# Patient Record
Sex: MALE | Race: WHITE | Employment: OTHER | URBAN - METROPOLITAN AREA
[De-identification: names, ages, dates, MRNs, and addresses within clinical notes are randomized per-mention and may not be internally consistent; named-entity substitution may affect disease eponyms.]

---

## 2017-12-17 ENCOUNTER — APPOINTMENT (OUTPATIENT)
Dept: GENERAL RADIOLOGY | Age: 72
End: 2017-12-17
Payer: MEDICARE

## 2017-12-17 ENCOUNTER — HOSPITAL ENCOUNTER (EMERGENCY)
Age: 72
Discharge: HOME | End: 2017-12-17
Attending: EMERGENCY MEDICINE
Payer: MEDICARE

## 2017-12-17 VITALS
RESPIRATION RATE: 18 BRPM | SYSTOLIC BLOOD PRESSURE: 130 MMHG | WEIGHT: 236 LBS | TEMPERATURE: 97.4 F | HEIGHT: 70 IN | OXYGEN SATURATION: 95 % | HEART RATE: 87 BPM | BODY MASS INDEX: 33.79 KG/M2 | DIASTOLIC BLOOD PRESSURE: 91 MMHG

## 2017-12-17 DIAGNOSIS — S80.12XA CONTUSION OF LEFT LOWER EXTREMITY, INITIAL ENCOUNTER: Primary | ICD-10-CM

## 2017-12-17 DIAGNOSIS — S81.812A LACERATION OF LEFT LOWER EXTREMITY, INITIAL ENCOUNTER: ICD-10-CM

## 2017-12-17 DIAGNOSIS — S20.212A CONTUSION OF RIB ON LEFT SIDE, INITIAL ENCOUNTER: ICD-10-CM

## 2017-12-17 PROCEDURE — 6360000002 HC RX W HCPCS: Performed by: NURSE PRACTITIONER

## 2017-12-17 PROCEDURE — 2500000003 HC RX 250 WO HCPCS: Performed by: NURSE PRACTITIONER

## 2017-12-17 PROCEDURE — 71101 X-RAY EXAM UNILAT RIBS/CHEST: CPT

## 2017-12-17 PROCEDURE — 6370000000 HC RX 637 (ALT 250 FOR IP): Performed by: NURSE PRACTITIONER

## 2017-12-17 PROCEDURE — 99282 EMERGENCY DEPT VISIT SF MDM: CPT

## 2017-12-17 PROCEDURE — 73590 X-RAY EXAM OF LOWER LEG: CPT

## 2017-12-17 PROCEDURE — 90471 IMMUNIZATION ADMIN: CPT | Performed by: NURSE PRACTITIONER

## 2017-12-17 PROCEDURE — 90715 TDAP VACCINE 7 YRS/> IM: CPT | Performed by: NURSE PRACTITIONER

## 2017-12-17 RX ORDER — HYDROCODONE BITARTRATE AND ACETAMINOPHEN 5; 325 MG/1; MG/1
1 TABLET ORAL EVERY 6 HOURS PRN
Qty: 20 TABLET | Refills: 0 | Status: SHIPPED | OUTPATIENT
Start: 2017-12-17 | End: 2017-12-24

## 2017-12-17 RX ORDER — LIDOCAINE HYDROCHLORIDE 10 MG/ML
20 INJECTION, SOLUTION INFILTRATION; PERINEURAL ONCE
Status: COMPLETED | OUTPATIENT
Start: 2017-12-17 | End: 2017-12-17

## 2017-12-17 RX ORDER — CEPHALEXIN 500 MG/1
500 CAPSULE ORAL 2 TIMES DAILY
Qty: 14 CAPSULE | Refills: 0 | Status: SHIPPED | OUTPATIENT
Start: 2017-12-17 | End: 2017-12-24

## 2017-12-17 RX ORDER — HYDROCODONE BITARTRATE AND ACETAMINOPHEN 5; 325 MG/1; MG/1
1 TABLET ORAL ONCE
Status: COMPLETED | OUTPATIENT
Start: 2017-12-17 | End: 2017-12-17

## 2017-12-17 RX ADMIN — LIDOCAINE HYDROCHLORIDE 20 ML: 10 INJECTION, SOLUTION INFILTRATION; PERINEURAL at 13:19

## 2017-12-17 RX ADMIN — HYDROCODONE BITARTRATE AND ACETAMINOPHEN 1 TABLET: 5; 325 TABLET ORAL at 12:44

## 2017-12-17 RX ADMIN — TETANUS TOXOID, REDUCED DIPHTHERIA TOXOID AND ACELLULAR PERTUSSIS VACCINE, ADSORBED 0.5 ML: 5; 2.5; 8; 8; 2.5 SUSPENSION INTRAMUSCULAR at 13:19

## 2017-12-17 ASSESSMENT — PAIN SCALES - GENERAL
PAINLEVEL_OUTOF10: 0
PAINLEVEL_OUTOF10: 10
PAINLEVEL_OUTOF10: 7

## 2017-12-17 ASSESSMENT — PAIN DESCRIPTION - ORIENTATION: ORIENTATION: LEFT;LOWER

## 2017-12-17 ASSESSMENT — PAIN DESCRIPTION - LOCATION: LOCATION: LEG

## 2017-12-17 ASSESSMENT — PAIN DESCRIPTION - DESCRIPTORS: DESCRIPTORS: SHARP;BURNING;THROBBING

## 2017-12-17 ASSESSMENT — PAIN DESCRIPTION - FREQUENCY: FREQUENCY: CONTINUOUS

## 2017-12-17 ASSESSMENT — PAIN SCALES - WONG BAKER: WONGBAKER_NUMERICALRESPONSE: 10

## 2017-12-20 NOTE — ED PROVIDER NOTES
[address] [place] [address] ED  eMERGENCY dEPARTMENT eNCOUnter      Pt Name: Vida Sesay  MRN: [id number]  [place] 1945  Date of evaluation: 12/17/2017  Provider: Tamela Lobo NP    CHIEF COMPLAINT       Chief Complaint   Patient presents with    Laceration     left lower leg,  hit on piece a furniture         HISTORY OF PRESENT ILLNESS  (Location/Symptom, Timing/Onset, Context/Setting, Quality, Duration, Modifying Factors, Severity.)   Vida Sesay is a 67 y.o. male who presents to the emergency department The private auto with complaints of leg pain and laceration. Less than 2 hours prior to arrival while attempting to lay down a heavy oak dresser the [employer] fell out of his hand and landed on his left lower leg sliding on the way down to his foot Resulting in laceration. He has pain to the left lower leg and left ribs. He states after he dropped a dresser he fell backwards and is having pain to the left lateral and posterior ribs. No neck or low back pain. No difficulty breathing. Pain for both his constant and worse with movement. The laceration to the leg had mild to moderate bleeding at the time of injury. He is unsure of his last tetanus update. Nursing Notes were reviewed. ALLERGIES     Review of patient's allergies indicates no known allergies. CURRENT MEDICATIONS       Discharge Medication List as of 12/17/2017  2:31 PM      CONTINUE these medications which have NOT CHANGED    Details   aspirin 81 MG tablet Take 81 mg by mouth 2 times daily. Dextrose, Diabetic Use, (GLUCOSE PO) Take 16 g by mouth. furosemide (LASIX) 40 MG tablet Take 40 mg by mouth daily. atorvastatin (LIPITOR) 80 MG tablet Take 80 mg by mouth nightly.      magnesium oxide (MAG-OX) 400 MG tablet Take 400 mg by mouth daily. metFORMIN (GLUCOPHAGE) 500 MG tablet Take 500 mg by mouth 2 times daily. metoprolol (LOPRESSOR) 25 MG tablet Take 25 mg by mouth 2 times daily.       potassium chloride (KLOR-CON) 20 MEQ packet Take 20 mEq by mouth daily. traMADol (ULTRAM) 50 MG tablet Take 50 mg by mouth every 4 hours as needed for Pain. acetic acid 0.25 % irrigation Irrigate with as directed daily. , Disp-1000 mL, R-4, Print             PAST MEDICAL HISTORY         Diagnosis Date    Arthritis     CAD (coronary artery disease)     atrial fib    Cancer (HCC)     melonoma head    CHF (congestive heart failure) (HCC)     [facility] in house [facility]    Diabetes mellitus ([address]     HgA1C over 9  medication induced    GERD (gastroesophageal reflux disease)      polpys removed   GI bleed    Hyperlipidemia     Hypertension     Liver disease     kidney stones    Other disorders of kidney and ureter in diseases classified elsewhere     Pneumonia     aspiration pneumonia,  drug induced coma 10/2014       SURGICAL HISTORY           Procedure Laterality Date    ABDOMEN SURGERY      hernia repair dr. Lisandra Faustin x5 07/09/2012 [place]    CHOLECYSTECTOMY      COLONOSCOPY      HERNIA REPAIR      KNEE ARTHROSCOPY           FAMILY HISTORY     History reviewed. No pertinent family history. No family status information on file. SOCIAL HISTORY      reports that he has never smoked. He has never used smokeless tobacco. He reports that he does not drink alcohol or use drugs. REVIEW OF SYSTEMS    (2-9 systems for level 4, 10 or more for level 5)     Review of Systems   All other systems reviewed and are negative. Except as noted above the remainder of the review of systems was reviewed and negative. PHYSICAL EXAM    (up to 7 for level 4, 8 or more for level 5)     ED Triage Vitals [12/17/17 1225]   BP Temp Temp Source Pulse Resp SpO2 Height Weight   (!) 130/91 97.4 °F (36.3 °C) Oral 87 18 95 % 5' 10\" (1.778 m) 236 lb (107 kg)       Physical Exam   Constitutional: He is oriented to person, place, and time. He appears well-developed and well-nourished.    HENT:   Head: Normocephalic and atraumatic. Eyes: Conjunctivae are normal.   Neck: Normal range of motion. No midline cervical tenderness   Cardiovascular: Normal rate and regular rhythm. Pulmonary/Chest: Effort normal and breath sounds normal.         He exhibits no tenderness. Musculoskeletal: Normal range of motion. He exhibits no edema or deformity. No midline thoracic or lumbar tenderness. He does have tenderness to the anterior left lower leg. Full range of motion to the ankle, knee and hip. Neurological: He is alert and oriented to person, place, and time. Skin: Skin is warm and dry. 6 cm flap laceration to the anterior left lower leg with mild bleeding         DIAGNOSTIC RESULTS     EKG: All EKG's are interpreted by the Emergency Department Physician who either signs or Co-signs this chart in the absence of a cardiologist.    RADIOLOGY:   Non-plain film images such as CT, Ultrasound and MRI are read by the radiologist. Plain radiographic images are visualized and preliminarily interpreted by the emergency physician with the below findings:    Interpretation per the Radiologist below, if available at the time of this note:    XR RIBS LEFT INCLUDE CHEST (MIN 3 VIEWS)   Final Result   No acute findings. XR TIBIA FIBULA LEFT (2 VIEWS)   Final Result   1. No evidence of acute osseous abnormality in the left leg. 2.  Extensive vascular calcification. LABS:  Labs Reviewed - No data to display    All other labs were within normal range or not returned as of this dictation. EMERGENCY DEPARTMENT COURSE and DIFFERENTIAL DIAGNOSIS/MDM:   Vitals:    Vitals:    17 1225   BP: (!) 130/91   Pulse: 87   Resp: 18   Temp: 97.4 °F (36.3 °C)   TempSrc: Oral   SpO2: 95%   Weight: 236 lb (107 kg)   Height: 5' 10\" (1.778 m)       Medical Decision Makin-year-old male with injury and laceration. He was medicated for pain and updated on his tetanus. Laceration was repaired as documented below. He was discharged home to follow up with his primary care provider for recheck. He will return to the emergency room for any new or worsening symptoms. PROCEDURES:  Lac Repair  Date/Time: 12/20/2017 3:17 PM  Performed by: Naveed Ewing  Authorized by: Narayan Vera     Consent:     Consent obtained:  Verbal    Consent given by:  Patient    Risks discussed:  Infection, need for additional repair and poor wound healing  Anesthesia (see MAR for exact dosages): Anesthesia method:  Local infiltration    Local anesthetic:  Lidocaine 1% w/o epi  Laceration details:     Location:  Leg    Leg location:  L lower leg    Length (cm):  6  Pre-procedure details:     Preparation:  Patient was prepped and draped in usual sterile fashion and imaging obtained to evaluate for foreign bodies  Exploration:     Wound exploration: wound explored through full range of motion      Wound extent: no foreign bodies/material noted and no underlying fracture noted    Treatment:     Area cleansed with:  Betadine and saline    Amount of cleaning:  Standard  Skin repair:     Repair method:  Sutures    Suture size:  4-0    Suture material:  Nylon    Suture technique:  Simple interrupted  Approximation:     Approximation:  Close  Post-procedure details:     Dressing:  Non-adherent dressing    Patient tolerance of procedure: Tolerated well, no immediate complications        FINAL IMPRESSION      1. Contusion of left lower extremity, initial encounter    2. Contusion of rib on left side, initial encounter    3.  Laceration of left lower extremity, initial encounter          DISPOSITION/PLAN   DISPOSITION Decision To Discharge 12/17/2017 02:29:25 PM      PATIENT REFERRED TO:   Lara Recio MD  [place] [id number]  [phone]      For suture removal in 7-10 days    Call the number below if you need to establish yourself with a new primary care provider  Aleah Xavier ([phone])          DISCHARGE MEDICATIONS:

## 2018-01-03 ENCOUNTER — HOSPITAL ENCOUNTER (EMERGENCY)
Age: 73
Discharge: HOME | End: 2018-01-03
Attending: EMERGENCY MEDICINE
Payer: MEDICARE

## 2018-01-03 VITALS
HEART RATE: 70 BPM | OXYGEN SATURATION: 96 % | RESPIRATION RATE: 18 BRPM | TEMPERATURE: 98.3 F | SYSTOLIC BLOOD PRESSURE: 132 MMHG | DIASTOLIC BLOOD PRESSURE: 73 MMHG

## 2018-01-03 DIAGNOSIS — Z51.89 VISIT FOR WOUND CHECK: Primary | ICD-10-CM

## 2018-01-03 DIAGNOSIS — Z48.02 VISIT FOR SUTURE REMOVAL: ICD-10-CM

## 2018-01-03 DIAGNOSIS — M79.606 PAIN OF LOWER EXTREMITY, UNSPECIFIED LATERALITY: ICD-10-CM

## 2018-01-03 PROCEDURE — 99281 EMR DPT VST MAYX REQ PHY/QHP: CPT

## 2018-01-03 RX ORDER — HYDROCODONE BITARTRATE AND ACETAMINOPHEN 5; 325 MG/1; MG/1
1 TABLET ORAL EVERY 6 HOURS PRN
Qty: 20 TABLET | Refills: 0 | Status: SHIPPED | OUTPATIENT
Start: 2018-01-03 | End: 2018-01-10

## 2018-01-03 NOTE — ED PROVIDER NOTES
[address] [place] [address] ED  Emergency Department  Faculty Attestation     Pt Name: Maribel Strauss  MRN: [id number]  [place] 1945  Date of evaluation: 1/3/18    I was personally available for consultation in the Emergency Department. Have reviewed everything on the chart that is available and agree with the documentation provided by the [facility] AND [facility], including discussion about the assessment, treatment plan and disposition. Maribel Strauss is a 68 y.o. male who presents with Suture / Staple Removal      Vitals:   Vitals:    01/03/18 1350   BP: 132/73   Pulse: 70   Resp: 18   Temp: 98.3 °F (36.8 °C)   SpO2: 96%       Impression:   1. Visit for wound check    2. Visit for suture removal    3.  Pain of lower extremity, unspecified laterality        Caren Bello MD  Attending Emergency Physician      (Please note that portions of this note were completed with a voice recognition program.  Efforts were made to edit the dictations but occasionally words are mis-transcribed.)        Caren Bello MD  01/03/18 7677
occasionally words are mis-transcribed. )    ZEYNEP RENNER NP  01/03/18 8815

## 2018-01-03 NOTE — ED NOTES
Pt presents to ED via private auto with c/o Suture removal.  Pt was seen on 12/17 for laceration. Pt here for removal.  Site is well approximated with hematoma still present around wound. No tenderness noted. Denies fever, chest pain, SOB, N/V/D, blurry vision and dizziness at this time. Pt is alert and oriented x 4 with no signs of distress noted.   Pt is resting on cot       Breana Pérez RN  01/03/18 2811

## 2018-01-31 ENCOUNTER — OFFICE VISIT (OUTPATIENT)
Dept: INTERNAL MEDICINE | Age: 73
End: 2018-01-31
Payer: MEDICARE

## 2018-01-31 ENCOUNTER — HOSPITAL ENCOUNTER (OUTPATIENT)
Age: 73
Discharge: HOME | End: 2018-01-31
Payer: MEDICARE

## 2018-01-31 VITALS
DIASTOLIC BLOOD PRESSURE: 74 MMHG | WEIGHT: 240 LBS | HEART RATE: 83 BPM | OXYGEN SATURATION: 94 % | BODY MASS INDEX: 34.44 KG/M2 | SYSTOLIC BLOOD PRESSURE: 114 MMHG

## 2018-01-31 DIAGNOSIS — Z13.220 SCREENING FOR HYPERLIPIDEMIA: ICD-10-CM

## 2018-01-31 DIAGNOSIS — E11.9 TYPE 2 DIABETES MELLITUS WITHOUT COMPLICATION, WITHOUT LONG-TERM CURRENT USE OF INSULIN (HCC): Primary | ICD-10-CM

## 2018-01-31 DIAGNOSIS — E78.00 HYPERCHOLESTEREMIA: ICD-10-CM

## 2018-01-31 DIAGNOSIS — I10 BENIGN ESSENTIAL HTN: ICD-10-CM

## 2018-01-31 DIAGNOSIS — I50.42 CHRONIC COMBINED SYSTOLIC AND DIASTOLIC CONGESTIVE HEART FAILURE (HCC): ICD-10-CM

## 2018-01-31 DIAGNOSIS — M25.512 CHRONIC LEFT SHOULDER PAIN: ICD-10-CM

## 2018-01-31 DIAGNOSIS — S81.802D WOUND OF LEFT LOWER EXTREMITY, SUBSEQUENT ENCOUNTER: ICD-10-CM

## 2018-01-31 DIAGNOSIS — G89.29 CHRONIC LEFT SHOULDER PAIN: ICD-10-CM

## 2018-01-31 LAB
CHOLESTEROL, FASTING: 163 MG/DL
CHOLESTEROL/HDL RATIO: 3
HDLC SERPL-MCNC: 55 MG/DL
LDL CHOLESTEROL: 76 MG/DL (ref 0–130)
TRIGLYCERIDE, FASTING: 162 MG/DL
VLDLC SERPL CALC-MCNC: ABNORMAL MG/DL (ref 1–30)

## 2018-01-31 PROCEDURE — 80061 LIPID PANEL: CPT

## 2018-01-31 PROCEDURE — 36415 COLL VENOUS BLD VENIPUNCTURE: CPT

## 2018-01-31 PROCEDURE — 99203 OFFICE O/P NEW LOW 30 MIN: CPT | Performed by: INTERNAL MEDICINE

## 2018-01-31 RX ORDER — ATORVASTATIN CALCIUM 80 MG/1
80 TABLET, FILM COATED ORAL NIGHTLY
Qty: 90 TABLET | Refills: 3 | Status: SHIPPED | OUTPATIENT
Start: 2018-01-31

## 2018-01-31 RX ORDER — LISINOPRIL 2.5 MG/1
2.5 TABLET ORAL DAILY
COMMUNITY
End: 2018-01-31 | Stop reason: SDUPTHER

## 2018-01-31 RX ORDER — FUROSEMIDE 40 MG/1
40 TABLET ORAL DAILY
Qty: 90 TABLET | Refills: 3 | Status: SHIPPED | OUTPATIENT
Start: 2018-01-31

## 2018-01-31 RX ORDER — LISINOPRIL 2.5 MG/1
2.5 TABLET ORAL DAILY
Qty: 90 TABLET | Refills: 3 | Status: SHIPPED | OUTPATIENT
Start: 2018-01-31

## 2018-01-31 RX ORDER — TRAMADOL HYDROCHLORIDE 50 MG/1
50 TABLET ORAL EVERY 4 HOURS PRN
Qty: 120 TABLET | Refills: 0 | Status: SHIPPED | OUTPATIENT
Start: 2018-01-31 | End: 2018-07-28

## 2018-01-31 ASSESSMENT — PATIENT HEALTH QUESTIONNAIRE - PHQ9
SUM OF ALL RESPONSES TO PHQ QUESTIONS 1-9: 0
1. LITTLE INTEREST OR PLEASURE IN DOING THINGS: 0
2. FEELING DOWN, DEPRESSED OR HOPELESS: 0
SUM OF ALL RESPONSES TO PHQ9 QUESTIONS 1 & 2: 0

## 2018-01-31 ASSESSMENT — ENCOUNTER SYMPTOMS
GASTROINTESTINAL NEGATIVE: 1
ALLERGIC/IMMUNOLOGIC NEGATIVE: 1
EYES NEGATIVE: 1
SHORTNESS OF BREATH: 1

## 2018-01-31 NOTE — PROGRESS NOTES
[facility] PHYSICIANS  [facility] INTERNAL MEDICINE ST Zeinab Campbell [facility] [address]  [address]  [place] [address]  Dept: [phone]  Dept Fax: [phone]  [place]   Tatiana Prescott is a 68 y.o. male who presents today for   Chief Complaint   Patient presents with   [facility] Other     patient has a open wound the left leg. Patient had stitches on dec. [address] Maintenance     Patient states he recieved pneumo vaccine at [facility], colonoscopy done 2014    Knee Pain    and follow up of chronic medical problems:   Patient Active Problem List   Diagnosis    Screening for hyperlipidemia    Type 2 diabetes mellitus without complication, without long-term current use of insulin ([address]    Benign essential HTN    Hypercholesteremia    Chronic combined systolic and diastolic congestive heart failure (HCC)    Chronic left shoulder pain   . Past Medical History:   Diagnosis Date    Arthritis     CAD (coronary artery disease)     atrial fib    Cancer ([address]     melonoma head    CHF (congestive heart failure) ([address]     when [facility] house [facility]    Diabetes mellitus ([address]     HgA1C over 9  medication induced    GERD (gastroesophageal reflux disease)      polpys removed   GI bleed    Hyperlipidemia     Hypertension     Liver disease     kidney stones    Other disorders of kidney and ureter in diseases classified elsewhere     Pneumonia     aspiration pneumonia,  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 Pawan Stewart x5 07/09/2012 [place]    CHOLECYSTECTOMY      COLONOSCOPY      HERNIA REPAIR      KNEE ARTHROSCOPY         History reviewed. No pertinent family history.     Social History   Substance Use Topics    Smoking status: Never Smoker    Smokeless tobacco: Never Used    Alcohol use No      Current Outpatient Prescriptions   Medication Sig Dispense Refill    metoprolol tartrate (LOPRESSOR) 25 MG tablet Take 0.5 tablets by mouth 2 times daily 90 tablet 3    metFORMIN (GLUCOPHAGE) 500 MG tablet Take 1 tablet by mouth 2 times daily 180 tablet 3    lisinopril (PRINIVIL;ZESTRIL) 2.5 MG tablet Take 1 tablet by mouth daily 90 tablet 3    furosemide (LASIX) 40 MG tablet Take 1 tablet by mouth daily 90 tablet 3    atorvastatin (LIPITOR) 80 MG tablet Take 1 tablet by mouth nightly 90 tablet 3    traMADol (ULTRAM) 50 MG tablet Take 1 tablet by mouth every 4 hours as needed for Pain for up to 178 days. 120 tablet 0    aspirin 81 MG tablet Take 81 mg by mouth 2 times daily. No current facility-administered medications for this visit. No Known Allergies    Health Maintenance   Topic Date Due    Lipid screen  01/03/1985    Colon cancer screen colonoscopy  01/03/1995    Zostavax vaccine  01/03/2005    Pneumococcal low/med risk (1 of 2 - PCV13) 01/03/2010    Potassium monitoring  04/30/2018 (Originally 1945)    Creatinine monitoring  04/30/2018 (Originally 10/12/2013)    Hepatitis C screen  04/30/2018 (Originally 1945)    DTaP/Tdap/Td vaccine (2 - Td) 12/17/2027    Flu vaccine  Completed       Controlled Substances Monitoring:      HPI:     Wound Check   He was originally treated more than 14 days ago (december 17th furniture fell on lower shin with u shaped laceration). Previous treatment included laceration repair. There has been bloody discharge from the wound. The redness has not changed. There is no swelling present. The pain has not changed. He has no difficulty moving the affected extremity or digit. Congestive Heart Failure   Presents for initial visit. The disease course has been improving. Associated symptoms include edema and shortness of breath. The symptoms have been improving. Past treatments include beta blockers, salt and fluid restriction and ACE inhibitors. The treatment provided moderate relief. Hypertension   This is a chronic problem.  The current episode started more than 1 year ago. The problem is unchanged. The problem is controlled. Associated symptoms include shortness of breath. Risk factors for coronary artery disease include diabetes mellitus and dyslipidemia. Past treatments include ACE inhibitors, beta blockers and diuretics. The current treatment provides significant improvement. Hypertensive end-organ damage includes heart failure. Diabetes   He presents for his follow-up diabetic visit. He has type 2 diabetes mellitus. His disease course has been stable. Diabetic complications include heart disease. Risk factors for coronary artery disease include diabetes mellitus, dyslipidemia and hypertension. Current diabetic treatment includes oral agent (monotherapy). He is compliant with treatment all of the time. An ACE inhibitor/angiotensin II receptor blocker is being taken. ROS:     Review of Systems   Constitutional: Negative. HENT: Negative. Eyes: Negative. Respiratory: Positive for shortness of breath. Cardiovascular: Negative. Gastrointestinal: Negative. Endocrine: Negative. Genitourinary: Negative. Musculoskeletal: Negative. Skin: Positive for wound. Allergic/Immunologic: Negative. Neurological: Positive for light-headedness. Hematological: Negative. Psychiatric/Behavioral: Negative. All other systems reviewed and are negative. Objective:     Physical Exam   Constitutional: He is oriented to person, place, and time. He appears well-developed and well-nourished. HENT:   Head: Normocephalic and atraumatic. Neck: Neck supple. Cardiovascular: Normal rate and regular rhythm. Pulmonary/Chest: Effort normal and breath sounds normal.   Abdominal: Soft. Musculoskeletal: He exhibits edema (trace). Neurological: He is alert and oriented to person, place, and time. Skin: Skin is warm and dry. Psychiatric: He has a normal mood and affect. His behavior is normal.   Vitals reviewed.

## 2018-03-02 ENCOUNTER — HOSPITAL ENCOUNTER (OUTPATIENT)
Age: 73
Discharge: HOME | End: 2018-03-02
Payer: MEDICARE

## 2018-03-02 DIAGNOSIS — E78.00 HYPERCHOLESTEREMIA: ICD-10-CM

## 2018-03-02 DIAGNOSIS — E11.9 TYPE 2 DIABETES MELLITUS WITHOUT COMPLICATION, WITHOUT LONG-TERM CURRENT USE OF INSULIN (HCC): ICD-10-CM

## 2018-03-02 LAB
ANION GAP SERPL CALCULATED.3IONS-SCNC: 11 MMOL/L (ref 9–17)
BUN BLDV-MCNC: 12 MG/DL (ref 8–23)
BUN/CREAT BLD: ABNORMAL (ref 9–20)
CALCIUM SERPL-MCNC: 9.5 MG/DL (ref 8.6–10.4)
CHLORIDE BLD-SCNC: 98 MMOL/L (ref 98–107)
CHOLESTEROL/HDL RATIO: 2.8
CHOLESTEROL: 141 MG/DL
CO2: 26 MMOL/L (ref 20–31)
CREAT SERPL-MCNC: 0.67 MG/DL (ref 0.7–1.2)
ESTIMATED AVERAGE GLUCOSE: 140 MG/DL
GFR AFRICAN AMERICAN: >60 ML/MIN
GFR NON-AFRICAN AMERICAN: >60 ML/MIN
GFR SERPL CREATININE-BSD FRML MDRD: ABNORMAL ML/MIN/{1.73_M2}
GFR SERPL CREATININE-BSD FRML MDRD: ABNORMAL ML/MIN/{1.73_M2}
GLUCOSE BLD-MCNC: 109 MG/DL (ref 70–99)
HBA1C MFR BLD: 6.5 % (ref 4–6)
HDLC SERPL-MCNC: 50 MG/DL
LDL CHOLESTEROL: 65 MG/DL (ref 0–130)
POTASSIUM SERPL-SCNC: 4 MMOL/L (ref 3.7–5.3)
SODIUM BLD-SCNC: 135 MMOL/L (ref 135–144)
TRIGL SERPL-MCNC: 132 MG/DL
VLDLC SERPL CALC-MCNC: NORMAL MG/DL (ref 1–30)

## 2018-03-02 PROCEDURE — 36415 COLL VENOUS BLD VENIPUNCTURE: CPT

## 2018-03-02 PROCEDURE — 80061 LIPID PANEL: CPT

## 2018-03-02 PROCEDURE — 83036 HEMOGLOBIN GLYCOSYLATED A1C: CPT

## 2018-03-02 PROCEDURE — 80048 BASIC METABOLIC PNL TOTAL CA: CPT

## 2018-04-11 PROBLEM — Z13.220 SCREENING FOR HYPERLIPIDEMIA: Status: RESOLVED | Noted: 2018-01-31 | Resolved: 2018-04-11

## 2018-08-23 ENCOUNTER — TELEPHONE (OUTPATIENT)
Dept: INTERNAL MEDICINE | Age: 73
End: 2018-08-23

## 2019-06-26 DIAGNOSIS — E11.9 TYPE 2 DIABETES MELLITUS WITHOUT COMPLICATION, WITHOUT LONG-TERM CURRENT USE OF INSULIN (HCC): Primary | ICD-10-CM

## 2019-08-28 ENCOUNTER — TELEPHONE (OUTPATIENT)
Dept: PRIMARY CARE CLINIC | Age: 74
End: 2019-08-28

## 2021-04-13 ENCOUNTER — ANESTHESIA EVENT (OUTPATIENT)
Dept: OPERATING ROOM | Age: 76
End: 2021-04-13
Payer: MEDICARE

## 2021-04-13 ENCOUNTER — HOSPITAL ENCOUNTER (OUTPATIENT)
Age: 76
Setting detail: OUTPATIENT SURGERY
Discharge: HOME | End: 2021-04-13
Attending: OPHTHALMOLOGY | Admitting: OPHTHALMOLOGY
Payer: MEDICARE

## 2021-04-13 ENCOUNTER — ANESTHESIA (OUTPATIENT)
Dept: OPERATING ROOM | Age: 76
End: 2021-04-13
Payer: MEDICARE

## 2021-04-13 VITALS
SYSTOLIC BLOOD PRESSURE: 133 MMHG | DIASTOLIC BLOOD PRESSURE: 76 MMHG | OXYGEN SATURATION: 98 % | RESPIRATION RATE: 14 BRPM

## 2021-04-13 VITALS
WEIGHT: 220 LBS | DIASTOLIC BLOOD PRESSURE: 87 MMHG | TEMPERATURE: 97.9 F | SYSTOLIC BLOOD PRESSURE: 109 MMHG | HEART RATE: 66 BPM | OXYGEN SATURATION: 96 % | HEIGHT: 70 IN | RESPIRATION RATE: 16 BRPM | BODY MASS INDEX: 31.5 KG/M2

## 2021-04-13 PROBLEM — H33.002 RETINAL DETACHMENT OF LEFT EYE WITH RETINAL BREAK: Status: ACTIVE | Noted: 2021-04-13

## 2021-04-13 LAB
EKG ATRIAL RATE: 76 BPM
EKG P AXIS: -15 DEGREES
EKG P-R INTERVAL: 160 MS
EKG Q-T INTERVAL: 402 MS
EKG QRS DURATION: 92 MS
EKG QTC CALCULATION (BAZETT): 452 MS
EKG R AXIS: -6 DEGREES
EKG T AXIS: -33 DEGREES
EKG VENTRICULAR RATE: 76 BPM
GFR NON-AFRICAN AMERICAN: >60 ML/MIN
GFR SERPL CREATININE-BSD FRML MDRD: >60 ML/MIN
GFR SERPL CREATININE-BSD FRML MDRD: NORMAL ML/MIN/{1.73_M2}
GLUCOSE BLD-MCNC: 108 MG/DL (ref 74–100)
GLUCOSE BLD-MCNC: 95 MG/DL (ref 75–110)
POC CREATININE: 0.87 MG/DL (ref 0.51–1.19)
POC POTASSIUM: 4.2 MMOL/L (ref 3.5–4.5)
SARS-COV-2, RAPID: NOT DETECTED
SPECIMEN DESCRIPTION: NORMAL

## 2021-04-13 PROCEDURE — 6360000002 HC RX W HCPCS: Performed by: OPHTHALMOLOGY

## 2021-04-13 PROCEDURE — 2580000003 HC RX 258: Performed by: OPHTHALMOLOGY

## 2021-04-13 PROCEDURE — 6370000000 HC RX 637 (ALT 250 FOR IP): Performed by: OPHTHALMOLOGY

## 2021-04-13 PROCEDURE — 93005 ELECTROCARDIOGRAM TRACING: CPT | Performed by: ANESTHESIOLOGY

## 2021-04-13 PROCEDURE — 87635 SARS-COV-2 COVID-19 AMP PRB: CPT

## 2021-04-13 PROCEDURE — 3700000001 HC ADD 15 MINUTES (ANESTHESIA): Performed by: OPHTHALMOLOGY

## 2021-04-13 PROCEDURE — 7100000041 HC SPAR PHASE II RECOVERY - ADDTL 15 MIN: Performed by: OPHTHALMOLOGY

## 2021-04-13 PROCEDURE — 3600000004 HC SURGERY LEVEL 4 BASE: Performed by: OPHTHALMOLOGY

## 2021-04-13 PROCEDURE — 6360000002 HC RX W HCPCS: Performed by: NURSE ANESTHETIST, CERTIFIED REGISTERED

## 2021-04-13 PROCEDURE — 2709999900 HC NON-CHARGEABLE SUPPLY: Performed by: OPHTHALMOLOGY

## 2021-04-13 PROCEDURE — 2500000003 HC RX 250 WO HCPCS: Performed by: OPHTHALMOLOGY

## 2021-04-13 PROCEDURE — 82947 ASSAY GLUCOSE BLOOD QUANT: CPT

## 2021-04-13 PROCEDURE — 84132 ASSAY OF SERUM POTASSIUM: CPT

## 2021-04-13 PROCEDURE — 3700000000 HC ANESTHESIA ATTENDED CARE: Performed by: OPHTHALMOLOGY

## 2021-04-13 PROCEDURE — 7100000040 HC SPAR PHASE II RECOVERY - FIRST 15 MIN: Performed by: OPHTHALMOLOGY

## 2021-04-13 PROCEDURE — 2500000003 HC RX 250 WO HCPCS: Performed by: NURSE ANESTHETIST, CERTIFIED REGISTERED

## 2021-04-13 PROCEDURE — 2720000010 HC SURG SUPPLY STERILE: Performed by: OPHTHALMOLOGY

## 2021-04-13 PROCEDURE — 2580000003 HC RX 258: Performed by: NURSE ANESTHETIST, CERTIFIED REGISTERED

## 2021-04-13 PROCEDURE — 3600000014 HC SURGERY LEVEL 4 ADDTL 15MIN: Performed by: OPHTHALMOLOGY

## 2021-04-13 PROCEDURE — 82565 ASSAY OF CREATININE: CPT

## 2021-04-13 RX ORDER — LIDOCAINE HYDROCHLORIDE 10 MG/ML
INJECTION, SOLUTION INFILTRATION; PERINEURAL PRN
Status: DISCONTINUED | OUTPATIENT
Start: 2021-04-13 | End: 2021-04-13 | Stop reason: SDUPTHER

## 2021-04-13 RX ORDER — FENTANYL CITRATE 50 UG/ML
25 INJECTION, SOLUTION INTRAMUSCULAR; INTRAVENOUS EVERY 5 MIN PRN
Status: DISCONTINUED | OUTPATIENT
Start: 2021-04-13 | End: 2021-04-13 | Stop reason: HOSPADM

## 2021-04-13 RX ORDER — FENTANYL CITRATE 50 UG/ML
50 INJECTION, SOLUTION INTRAMUSCULAR; INTRAVENOUS EVERY 5 MIN PRN
Status: DISCONTINUED | OUTPATIENT
Start: 2021-04-13 | End: 2021-04-13 | Stop reason: HOSPADM

## 2021-04-13 RX ORDER — PHENYLEPHRINE HYDROCHLORIDE 100 MG/ML
1 SOLUTION/ DROPS OPHTHALMIC EVERY 5 MIN PRN
Status: COMPLETED | OUTPATIENT
Start: 2021-04-13 | End: 2021-04-13

## 2021-04-13 RX ORDER — SODIUM CHLORIDE, SODIUM LACTATE, POTASSIUM CHLORIDE, CALCIUM CHLORIDE 600; 310; 30; 20 MG/100ML; MG/100ML; MG/100ML; MG/100ML
INJECTION, SOLUTION INTRAVENOUS CONTINUOUS PRN
Status: DISCONTINUED | OUTPATIENT
Start: 2021-04-13 | End: 2021-04-13 | Stop reason: SDUPTHER

## 2021-04-13 RX ORDER — ERYTHROMYCIN 5 MG/G
OINTMENT OPHTHALMIC PRN
Status: DISCONTINUED | OUTPATIENT
Start: 2021-04-13 | End: 2021-04-13 | Stop reason: ALTCHOICE

## 2021-04-13 RX ORDER — FENTANYL CITRATE 50 UG/ML
INJECTION, SOLUTION INTRAMUSCULAR; INTRAVENOUS PRN
Status: DISCONTINUED | OUTPATIENT
Start: 2021-04-13 | End: 2021-04-13 | Stop reason: SDUPTHER

## 2021-04-13 RX ORDER — BALANCED SALT SOLUTION ENRICHED WITH BICARBONATE, DEXTROSE, AND GLUTATHIONE
KIT INTRAOCULAR PRN
Status: DISCONTINUED | OUTPATIENT
Start: 2021-04-13 | End: 2021-04-13 | Stop reason: ALTCHOICE

## 2021-04-13 RX ORDER — PROPOFOL 10 MG/ML
INJECTION, EMULSION INTRAVENOUS PRN
Status: DISCONTINUED | OUTPATIENT
Start: 2021-04-13 | End: 2021-04-13 | Stop reason: SDUPTHER

## 2021-04-13 RX ORDER — CYCLOPENTOLATE HYDROCHLORIDE 20 MG/ML
SOLUTION/ DROPS OPHTHALMIC PRN
Status: DISCONTINUED | OUTPATIENT
Start: 2021-04-13 | End: 2021-04-13 | Stop reason: ALTCHOICE

## 2021-04-13 RX ORDER — CYCLOPENTOLATE HYDROCHLORIDE 20 MG/ML
1 SOLUTION/ DROPS OPHTHALMIC EVERY 5 MIN PRN
Status: COMPLETED | OUTPATIENT
Start: 2021-04-13 | End: 2021-04-13

## 2021-04-13 RX ORDER — DEXTROSE MONOHYDRATE 25 G/50ML
INJECTION, SOLUTION INTRAVENOUS PRN
Status: DISCONTINUED | OUTPATIENT
Start: 2021-04-13 | End: 2021-04-13 | Stop reason: ALTCHOICE

## 2021-04-13 RX ADMIN — PHENYLEPHRINE HYDROCHLORIDE 1 DROP: 100 SOLUTION/ DROPS OPHTHALMIC at 07:06

## 2021-04-13 RX ADMIN — LIDOCAINE HYDROCHLORIDE 50 MG: 10 INJECTION, SOLUTION EPIDURAL; INFILTRATION; INTRACAUDAL; PERINEURAL at 07:39

## 2021-04-13 RX ADMIN — PHENYLEPHRINE HYDROCHLORIDE 1 DROP: 100 SOLUTION/ DROPS OPHTHALMIC at 07:18

## 2021-04-13 RX ADMIN — FENTANYL CITRATE 25 MCG: 50 INJECTION, SOLUTION INTRAMUSCULAR; INTRAVENOUS at 08:00

## 2021-04-13 RX ADMIN — CYCLOPENTOLATE HYDROCHLORIDE 1 DROP: 20 SOLUTION/ DROPS OPHTHALMIC at 07:06

## 2021-04-13 RX ADMIN — CYCLOPENTOLATE HYDROCHLORIDE 1 DROP: 20 SOLUTION/ DROPS OPHTHALMIC at 07:12

## 2021-04-13 RX ADMIN — PHENYLEPHRINE HYDROCHLORIDE 1 DROP: 100 SOLUTION/ DROPS OPHTHALMIC at 07:12

## 2021-04-13 RX ADMIN — GENTAMICIN, PREDNISOLONE ACETATE 1 DROP: 3; 10 SUSPENSION/ DROPS OPHTHALMIC at 07:06

## 2021-04-13 RX ADMIN — SODIUM CHLORIDE, POTASSIUM CHLORIDE, SODIUM LACTATE AND CALCIUM CHLORIDE: 600; 310; 30; 20 INJECTION, SOLUTION INTRAVENOUS at 07:29

## 2021-04-13 RX ADMIN — CYCLOPENTOLATE HYDROCHLORIDE 1 DROP: 20 SOLUTION/ DROPS OPHTHALMIC at 07:17

## 2021-04-13 RX ADMIN — PROPOFOL 50 MG: 10 INJECTION, EMULSION INTRAVENOUS at 07:39

## 2021-04-13 RX ADMIN — FENTANYL CITRATE 50 MCG: 50 INJECTION, SOLUTION INTRAMUSCULAR; INTRAVENOUS at 07:47

## 2021-04-13 ASSESSMENT — PAIN SCALES - GENERAL
PAINLEVEL_OUTOF10: 0
PAINLEVEL_OUTOF10: 0

## 2021-04-13 ASSESSMENT — PAIN DESCRIPTION - DESCRIPTORS: DESCRIPTORS: ACHING

## 2021-04-13 ASSESSMENT — ENCOUNTER SYMPTOMS: SHORTNESS OF BREATH: 0

## 2021-04-13 NOTE — H&P
excision. Social History   reports that he has never smoked. He has never used smokeless tobacco.   reports no history of alcohol use. reports no history of drug use. Marital Status   Occupation  x 31 years   Family History  Family Status   Relation Name Status    Father      Brother  (Not Specified)   [employer] Brother   at age 37     family history includes Cancer in his father; Heart Attack in his brother; Heart Disease in his brother and father. OBJECTIVE:   VITALS:  height is 5' 10\" (1.778 m) and weight is 220 lb (99.8 kg). His temporal temperature is 97.5 °F (36.4 °C). His blood pressure is 173/98 (abnormal) and his pulse is 67. His respiration is 16 and oxygen saturation is 96%. CONSTITUTIONAL:alert & oriented x 3, no acute distress. Friendly. SKIN:  Warm and dry, no rashes on exposed areas of skin   HEAD:  Normocephalic, atraumatic   EYES: PERRL. EOMs intact. EARS:  Equal bilaterally, no edema or thickening, skin is intact without lumps or lesions. No discharge. Hearing grossly WNL. NOSE:  Nares patent. No rhinorrhea   MOUTH/THROAT:  Mucous membranes moist, tongue is pink, uvula midline, full dentures  NECK:supple, no lymphadenopathy  LUNGS: Respirations even and non-labored. Clear to auscultation bilaterally, no wheezes, rales, or rhonchi. CARDIOVASCULAR: Regular rate and rhythm, no murmurs/rubs/gallops   ABDOMEN: soft, non-tender, non-distended, bowel sounds active x 4   EXTREMITIES: No edema bilateral lower extremities. Skin hyperpigmentation left lower leg (says scar from injury)  NEUROLOGIC: CN II-XII are grossly intact. Gait not assessed.    IMPRESSIONS:   MAC OFF RETINAL DETACHEMNT LEFT EYE      Diagnosis Date    Arthritis     CAD (coronary artery disease)     atrial fib, follows with Dr. 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

## 2021-04-13 NOTE — ANESTHESIA PRE PROCEDURE
Department of Anesthesiology  Preprocedure Note       Name:  Swapnil Camara   Age:  68 y.o.  :  1945                                          MRN:  [id number]         Date:  2021      Surgeon: [place]):  Zach Underwood MD    Procedure: Procedure(s):  VITRECTOMY 23 GAUGE, GAS FLUID EXCHANGE, LASER    Medications prior to admission:   Prior to Admission medications    Medication Sig Start Date End Date Taking? Authorizing Provider   metoprolol tartrate (LOPRESSOR) 25 MG tablet Take 0.5 tablets by mouth 2 times daily 18  Yes Adonis Benavides MD   metFORMIN (GLUCOPHAGE) 500 MG tablet Take 1 tablet by mouth 2 times daily 18  Yes Adonis Benavides MD   lisinopril (PRINIVIL;ZESTRIL) 2.5 MG tablet Take 1 tablet by mouth daily 18  Yes Adonis Benavides MD   furosemide (LASIX) 40 MG tablet Take 1 tablet by mouth daily 18  Yes Adonis Benavides MD   atorvastatin (LIPITOR) 80 MG tablet Take 1 tablet by mouth nightly 18  Yes Adonis Benavides MD   aspirin 81 MG tablet Take 81 mg by mouth 2 times daily.    Yes Historical Provider, MD       Current medications:    Current Facility-Administered Medications   Medication Dose Route Frequency Provider Last Rate Last Admin    gentamicin-prednisoLONE 0.3-1 % ophthalmic drops 1 drop  1 drop Left Eye On Call to [address], MD        cyclopentolate (CYCLOGYL) 2 % ophthalmic solution 1 drop  1 drop Left Eye Q5 Min PRN Zach Underwood MD        phenylephrine (LEANA-SYNEPHRINE) 10 % ophthalmic solution 1 drop  1 drop Left Eye Q5 Min PRN Zach Underwood MD           Allergies:  No Known Allergies    Problem List:    Patient Active Problem List   Diagnosis Code    Type 2 diabetes mellitus without complication, without long-term current use of insulin (HCC) E11.9    Benign essential HTN I10    Hypercholesteremia E78.00    Chronic combined systolic and diastolic congestive heart failure (HCC) I50.42    Chronic left shoulder pain M25.512, G89.29 CL 98 03/02/2018    CO2 26 03/02/2018    BUN 12 03/02/2018    CREATININE 0.87 04/13/2021    CREATININE 0.67 03/02/2018    GFRAA >60 03/02/2018    LABGLOM >60 04/13/2021    GLUCOSE 109 03/02/2018    CALCIUM 9.5 03/02/2018       POC Tests:   Recent Labs     04/13/21  0700   POCGLU 108*   POCK 4.2       Coags: No results found for: PROTIME, INR, APTT    HCG (If Applicable): No results found for: PREGTESTUR, PREGSERUM, HCG, HCGQUANT     ABGs: No results found for: PHART, PO2ART, TNK0NXP, QBK7FEJ, BEART, I1XBHJFE     Type & Screen (If Applicable):  No results found for: LABABO, LABRH    Drug/Infectious Status (If Applicable):  No results found for: HIV, HEPCAB    COVID-19 Screening (If Applicable):   Lab Results   Component Value Date    COVID19 Not Detected 04/13/2021           Anesthesia Evaluation  Patient summary reviewed and Nursing notes reviewed no history of anesthetic complications:   Airway: Mallampati: II  TM distance: >3 FB   Neck ROM: full  Mouth opening: > = 3 FB Dental:    (+) edentulous      Pulmonary:normal exam  breath sounds clear to auscultation  (+) pneumonia:      (-) COPD, asthma, shortness of breath, recent URI and sleep apnea                           Cardiovascular:  Exercise tolerance: good (>4 METS),   (+) hypertension:, CAD:, dysrhythmias: atrial fibrillation, CHF:, hyperlipidemia    (-) past MI, CABG/stent,  angina, orthopnea and  REYES      Rhythm: regular  Rate: normal                    Neuro/Psych:      (-) seizures, TIA and CVA            ROS comment: Retinal detachment GI/Hepatic/Renal:   (+) GERD:,           Endo/Other:    (+) DiabetesType II DM, poorly controlled, , .                 Abdominal:           Vascular: negative vascular ROS. Anesthesia Plan      MAC     ASA 3       Induction: intravenous. Anesthetic plan and risks discussed with patient.       Plan discussed with CRNA and surgical team.                  James Harvey MD 4/13/2021

## 2021-04-13 NOTE — OP NOTE
[address]                  [facility]                                OPERATIVE REPORT    PATIENT NAME: Gloria Kaufman                       :        1945  MED REC NO:   [id number]                             ROOM:  ACCOUNT NO:   [de-identified]                           ADMIT DATE: 2021  PROVIDER:     Mary Stout    DATE OF PROCEDURE:  2021    PREOPERATIVE DIAGNOSES:  1. Macula-off rhegmatogenous retinal detachment, left eye. 2.  Pseudophakia, left eye. POSTOPERATIVE DIAGNOSES:  1. Macula-off rhegmatogenous retinal detachment, left eye. 2.  Pseudophakia, left eye. SURGEON:  Mary Stout MD    ANESTHESIA:  Local monitored anesthesia care. COMPLICATIONS:  None. BLOOD LOSS:  Minimal.    SURGERIES PERFORMED:  1.  Pars plana vitrectomy for retinal detachment. 2.  Fluid-air exchange. 3.  Endolaser photocoagulation treatment. 4.  Air-gas exchange, 20% SF6.    JUSTIFICATION:  This very pleasant 60-year-old male has a history of  decreased vision in the left eye for 1-2 days prior to admission. He  was examined and found to have a bullous superior macula-off detachment  with a single horseshoe tear at the 12:30 meridian in the left eye. The  patient was given informed consent about retinal reattachment through  vitrectomy surgery and signed his consent. The patient was taken to the operating room where a IV sedation was  given, and a retrobulbar block was applied with a 50/50 mixture of  Marcaine and lidocaine. After good akinesia and anesthesia were  established, the patient was prepped and draped in the usual sterile  fashion. A wire lid speculum was used to open up the lids of the left  eye. 23-gauge trocars were placed 3.5 mm posterior to the limbus in the  temporal and superonasal quadrants.   The infusion cannula was placed in  the inferotemporal trocar and directly visually inspected prior to being  turned on. Core vitrectomy was performed. The peripheral vitreous was  trimmed from the edges of the retinal flap and for 360 degrees in the  periphery. The patient's IOL remained well centered throughout the  operation and did not obstruct the view of the peripheral retina. After  the vitrectomy had been completed and 90% fluid-air exchange was  performed through the retinal break, there was still a pool of  subretinal fluid along the superotemporal arcade into the macula, which  was relieved by making a small retinotomy outside the superotemporal  arcade infiltrating the last of the subretinal fluid. Endolaser photocoagulation treatment was placed all around the retinal  break and for 360 degrees in the periphery. No other breaks, tears,  holes or detachment were detected. The patient finally had the last of  the preretinal and subretinal fluid removed and then the small  retinotomy along the superotemporal arcade was treated with endolaser. A gas-gas exchange was then performed with a 20% non-expansile mixture  of SF6. Each of the trocars were removed and each site was rendered  airtight. The pressure was normal to palpation after the infusion  cannula. Ancef solution was placed over the left eye. Erythromycin  ointment and Cyclogyl drops were placed on the left eye and a patch and  shield was placed over the left eye. The patient was taken to the  recovery room in good condition having tolerated the procedure well and  without complication.         Darcie MEDEROS    D: 04/13/2021 8:47:31       T: 04/13/2021 8:53:24     [email or web address]  Job#: [id number]     Doc#: [id number]    CC:

## 2021-04-13 NOTE — BRIEF OP NOTE
Brief Postoperative Note      Patient: Yessi Cowan  YOB: 1945  MRN: [id number]    Date of Procedure: 4/13/2021    Pre-Op Diagnosis: MAC OFF RETINAL DETACHEMNT LEFT EYE    Post-Op Diagnosis: Same       Procedure(s):  VITRECTOMY 23 GAUGE, AIR FLUID EXCHANGE, AIR GAS EXCHANGE WITH 20% SF6, ENDOLASER 200   SPOTS    Surgeon(s):  Bhavik Rendon MD    Assistant:  * No surgical staff found *    Anesthesia: Monitor Anesthesia Care    Estimated Blood Loss (mL): Minimal    Complications: None    Specimens:   * No specimens in log *    Implants:  * No implants in log *      Drains: * No LDAs found *    Findings: Same    Electronically signed by Bhavik Rendon MD on 4/13/2021 at 8:27 AM

## 2021-10-09 ENCOUNTER — HOSPITAL ENCOUNTER (EMERGENCY)
Age: 76
Discharge: HOME | End: 2021-10-09
Attending: EMERGENCY MEDICINE
Payer: MEDICARE

## 2021-10-09 ENCOUNTER — APPOINTMENT (OUTPATIENT)
Dept: GENERAL RADIOLOGY | Age: 76
End: 2021-10-09
Payer: MEDICARE

## 2021-10-09 ENCOUNTER — APPOINTMENT (OUTPATIENT)
Dept: CT IMAGING | Age: 76
End: 2021-10-09
Payer: MEDICARE

## 2021-10-09 VITALS
SYSTOLIC BLOOD PRESSURE: 173 MMHG | DIASTOLIC BLOOD PRESSURE: 109 MMHG | WEIGHT: 225 LBS | HEIGHT: 70 IN | OXYGEN SATURATION: 93 % | HEART RATE: 67 BPM | RESPIRATION RATE: 16 BRPM | TEMPERATURE: 98.2 F | BODY MASS INDEX: 32.21 KG/M2

## 2021-10-09 DIAGNOSIS — S76.011A HIP STRAIN, RIGHT, INITIAL ENCOUNTER: Primary | ICD-10-CM

## 2021-10-09 PROCEDURE — 72192 CT PELVIS W/O DYE: CPT

## 2021-10-09 PROCEDURE — 73502 X-RAY EXAM HIP UNI 2-3 VIEWS: CPT

## 2021-10-09 PROCEDURE — 72100 X-RAY EXAM L-S SPINE 2/3 VWS: CPT

## 2021-10-09 PROCEDURE — 72131 CT LUMBAR SPINE W/O DYE: CPT

## 2021-10-09 PROCEDURE — 6370000000 HC RX 637 (ALT 250 FOR IP): Performed by: EMERGENCY MEDICINE

## 2021-10-09 PROCEDURE — 96372 THER/PROPH/DIAG INJ SC/IM: CPT

## 2021-10-09 PROCEDURE — 99284 EMERGENCY DEPT VISIT MOD MDM: CPT

## 2021-10-09 PROCEDURE — 6360000002 HC RX W HCPCS: Performed by: EMERGENCY MEDICINE

## 2021-10-09 RX ORDER — OXYCODONE HYDROCHLORIDE AND ACETAMINOPHEN 5; 325 MG/1; MG/1
1 TABLET ORAL EVERY 6 HOURS PRN
Qty: 12 TABLET | Refills: 0 | Status: SHIPPED | OUTPATIENT
Start: 2021-10-09 | End: 2021-10-12

## 2021-10-09 RX ORDER — OXYCODONE HYDROCHLORIDE AND ACETAMINOPHEN 5; 325 MG/1; MG/1
1 TABLET ORAL ONCE
Status: COMPLETED | OUTPATIENT
Start: 2021-10-09 | End: 2021-10-09

## 2021-10-09 RX ORDER — FENTANYL CITRATE 50 UG/ML
50 INJECTION, SOLUTION INTRAMUSCULAR; INTRAVENOUS ONCE
Status: COMPLETED | OUTPATIENT
Start: 2021-10-09 | End: 2021-10-09

## 2021-10-09 RX ADMIN — OXYCODONE AND ACETAMINOPHEN 1 TABLET: 5; 325 TABLET ORAL at 12:18

## 2021-10-09 RX ADMIN — FENTANYL CITRATE 50 MCG: 0.05 INJECTION, SOLUTION INTRAMUSCULAR; INTRAVENOUS at 13:38

## 2021-10-09 ASSESSMENT — PAIN SCALES - GENERAL
PAINLEVEL_OUTOF10: 10
PAINLEVEL_OUTOF10: 8
PAINLEVEL_OUTOF10: 10
PAINLEVEL_OUTOF10: 8

## 2021-10-09 ASSESSMENT — PAIN DESCRIPTION - ORIENTATION: ORIENTATION: RIGHT

## 2021-10-09 ASSESSMENT — ENCOUNTER SYMPTOMS: BACK PAIN: 0

## 2021-10-09 ASSESSMENT — PAIN DESCRIPTION - LOCATION: LOCATION: BUTTOCKS

## 2021-10-09 ASSESSMENT — PAIN DESCRIPTION - FREQUENCY: FREQUENCY: CONTINUOUS

## 2021-10-09 NOTE — ED PROVIDER NOTES
[facility]  Emergency Department Encounter     Pt Name: Hilario Suárez  MRN: [id number]  [place] 1945  Date of evaluation: 10/9/21  PCP:  Tawnya Chin MD    CHIEF COMPLAINT       Chief Complaint   Patient presents with    Hip Pain     right        HISTORY OF PRESENT ILLNESS  (Location/Symptom, Timing/Onset, Context/Setting, Quality, Duration, Modifying Factors, Severity.)    Hilario Suárez is a 68 y.o. male who presents with right-sided hip pain that started immediately prior to arrival.  Patient states that he was up early cleaning up the garage and went to bend over to pick something up and felt a pop in his right hip and had sudden onset of pain. Did not take anything for pain prior to arrival.  He states that the pain radiates down the back of his leg but has had no numbness or tingling weakness to this extremity. Is not having any back pain. Is on any blood thinners. Denies any other injuries. PAST MEDICAL / SURGICAL / SOCIAL / FAMILY HISTORY    has a past medical history of Arthritis, CAD (coronary artery disease), Cancer ([address] CHF (congestive heart failure) ([address] Diabetes mellitus ([address] Full dentures, GERD (gastroesophageal reflux disease), History of kidney stones, Hyperlipidemia, Hypertension, Other disorders of kidney and ureter in diseases classified elsewhere, Pneumonia, and Snores. has a past surgical history that includes Cholecystectomy; Colonoscopy; Abdomen surgery; Cardiac surgery; Colon surgery (2014); Knee arthroscopy (Bilateral); Cataract removal with implant (Bilateral); Skin cancer excision; vitrectomy (Left, 04/13/2021); and vitrectomy (Left, 4/13/2021).     Social History     Socioeconomic History    Marital status: Single     Spouse name: Not on file    Number of children: Not on file    Years of education: Not on file    Highest education level: Not on file   Occupational History    Not on file   Tobacco Use    Smoking status: Never Smoker    Smokeless tobacco: Never Used   Substance and Sexual Activity    Alcohol use: No    Drug use: No    Sexual activity: Not on file   Other Topics Concern    Not on file   Social History Narrative    Not on file     Social Determinants of Health     Financial Resource Strain:     Difficulty of Paying Living Expenses:    Food Insecurity:     Worried About Running Out of Food in the Last Year:     [address] in the Last Year:    Transportation Needs:     Lack of Transportation (Medical):  Lack of Transportation (Non-Medical):    Physical Activity:     Days of Exercise per Week:     Minutes of Exercise per Session:    Stress:     Feeling of Stress :    Social Connections:     Frequency of Communication with Friends and Family:     Frequency of Social Gatherings with Friends and Family:     Attends [employer] Services:     Active Member of Clubs or Organizations:     Attends Club or Organization Meetings:     Marital Status:    Intimate Partner Violence:     Fear of Current or Ex-Partner:     Emotionally Abused:     Physically Abused:     Sexually Abused:        Family History   Problem Relation Age of Onset    Heart Disease Father     Cancer Father     Heart Disease Brother     Heart Attack Brother        Allergies:    Patient has no known allergies. Home Medications:  Prior to Admission medications    Medication Sig Start Date End Date Taking? Authorizing Provider   oxyCODONE-acetaminophen (PERCOCET) 5-325 MG per tablet Take 1 tablet by mouth every 6 hours as needed for Pain for up to 3 days. Intended supply: 3 days.  Take lowest dose possible to manage pain 10/9/21 10/12/21 Yes Jocelyne Mcqueen DO   metoprolol tartrate (LOPRESSOR) 25 MG tablet Take 0.5 tablets by mouth 2 times daily 1/31/18   Jami Jimenez MD   metFORMIN (GLUCOPHAGE) 500 MG tablet Take 1 tablet by mouth 2 times daily 1/31/18   Jami Jimenez MD   lisinopril (PRINIVIL;ZESTRIL) 2.5 MG tablet Take 1 tablet by mouth daily 1/31/18   Mita Lozada MD   furosemide (LASIX) 40 MG tablet Take 1 tablet by mouth daily 1/31/18   Mita Lozada MD   atorvastatin (LIPITOR) 80 MG tablet Take 1 tablet by mouth nightly 1/31/18   Mita Lozada MD   aspirin 81 MG tablet Take 81 mg by mouth 2 times daily. Historical Provider, MD       REVIEW OF SYSTEMS    (2-9 systems for level 4, 10 or more for level 5)    Review of Systems   Musculoskeletal: Positive for arthralgias and myalgias. Negative for back pain. Neurological: Negative for weakness and numbness. Hematological: Does not bruise/bleed easily. PHYSICAL EXAM   (up to 7 for level 4, 8 or more for level 5)    VITALS:   Vitals:    10/09/21 1145   BP: (!) 173/109   Pulse: 67   Resp: 16   Temp: 98.2 °F (36.8 °C)   TempSrc: Oral   SpO2: 93%   Weight: 225 lb (102.1 kg)   Height: 5' 10\" (1.778 m)       Physical Exam  Vitals and nursing note reviewed. Constitutional:       General: He is not in acute distress. Appearance: He is well-developed. He is obese. He is not diaphoretic. HENT:      Head: Normocephalic and atraumatic. Eyes:      Conjunctiva/sclera: Conjunctivae normal.   Cardiovascular:      Rate and Rhythm: Normal rate and regular rhythm. Pulses: Normal pulses. Dorsalis pedis pulses are 2+ on the right side and 2+ on the left side. Posterior tibial pulses are 2+ on the right side and 2+ on the left side. Pulmonary:      Effort: Pulmonary effort is normal. No respiratory distress. Abdominal:      Tenderness: There is no right CVA tenderness or left CVA tenderness. Musculoskeletal:      Cervical back: Normal and normal range of motion. Thoracic back: Normal.      Lumbar back: Normal.      Right hip: Tenderness present. No deformity, bony tenderness or crepitus. Decreased range of motion. Normal strength. Right upper leg: Normal.      Right knee: Normal.   Skin:     General: Skin is warm and dry. Findings: No bruising or erythema. Neurological:      General: No focal deficit present. Mental Status: He is alert. Sensory: Sensation is intact. Motor: Motor function is intact. Gait: Gait abnormal.      Comments: Antalgic gait but can ambulate independently with the assistance of walker   Psychiatric:         Behavior: Behavior normal.         DIFFERENTIAL  DIAGNOSIS   PLAN (LABS / IMAGING / EKG):  Orders Placed This Encounter   Procedures    XR HIP 2-3 VW W PELVIS RIGHT    XR LUMBAR SPINE (2-3 VIEWS)    CT LUMBAR SPINE WO CONTRAST    CT PELVIS WO CONTRAST Additional Contrast? None    Jo Padilla MD, Orthopedic Surgery, [facility]       MEDICATIONS ORDERED:  Orders Placed This Encounter   Medications    oxyCODONE-acetaminophen (PERCOCET) 5-325 MG per tablet 1 tablet    fentaNYL (SUBLIMAZE) injection 50 mcg    oxyCODONE-acetaminophen (PERCOCET) 5-325 MG per tablet     Sig: Take 1 tablet by mouth every 6 hours as needed for Pain for up to 3 days. Intended supply: 3 days. Take lowest dose possible to manage pain     Dispense:  12 tablet     Refill:  0       DIAGNOSTIC RESULTS / EMERGENCYDEPARTMENT COURSE / MDM   LABS:  Labs Reviewed - No data to display    RADIOLOGY:  XR LUMBAR SPINE (2-3 VIEWS)    Result Date: 10/9/2021  EXAMINATION: THREE XRAY VIEWS OF THE LUMBAR SPINE 10/9/2021 12:28 pm COMPARISON: None. HISTORY: ORDERING SYSTEM PROVIDED HISTORY: bent over R low back/hip pain TECHNOLOGIST PROVIDED HISTORY: bent over R low back/hip pain FINDINGS: Grade 1 anterolisthesis L5 on S1 and L4 on L5 and grade 1 retrolisthesis L3 on L4 and L2 on L3 appear degenerative in etiology. .  Atherosclerotic aortic calcifications. Multilevel degenerative disc disease and facet joint arthropathy are noted. No fractures or destructive bony abnormalities are identified. 1. Multilevel degenerative disc disease and facet joint arthropathy 2.  No acute lumbar spine abnormality     CT LUMBAR SPINE WO CONTRAST    Result Date: 10/9/2021  EXAMINATION: CT OF THE LUMBAR SPINE WITHOUT CONTRAST  10/9/2021 TECHNIQUE: CT of the lumbar spine was performed without the administration of intravenous contrast. Multiplanar reformatted images are provided for review. Adjustment of mA and/or kV according to patient size was utilized. Dose modulation, iterative reconstruction, and/or weight based adjustment of the mA/kV was utilized to reduce the radiation dose to as low as reasonably achievable. COMPARISON: None HISTORY: ORDERING SYSTEM PROVIDED HISTORY: BACK PAIN TECHNOLOGIST PROVIDED HISTORY: R sided low back pain after strain Decision Support Exception - unselect if not a suspected or confirmed emergency medical condition->Emergency Medical Condition (MA) Reason for Exam: Pt states felt \"popping\" sensation after bending down this morning; c/o right lower back and hip pain. No prior surgery Acuity: Acute Type of Exam: Initial FINDINGS: BONES/ALIGNMENT: There is grade 1 spondylolisthesis of L5 on S1 and L4 with respect to L3. .  The vertebral body heights are maintained. No osseous destructive lesion is seen. DEGENERATIVE CHANGES:  Degenerative disc disease noted at T12-L1, L1-2, L2-3, L3-4 , L4-L5 and L5-S1 with loss of height and early sclerosis of the articulating endplates. There are posterior marginal osteophytes with associated broad-based disc bulges at these levels with associated facet hypertrophy and ligamentous hypertrophy producing mild to moderate central canal and lateral recess spinal stenosis at L1-2, L2-3, L3-4. Bilateral pars defects at L4 and L5. .  The neural foramina appear to remain patent. SOFT TISSUES/RETROPERITONEUM: The paraspinal soft tissue show no gross abnormalities. No paraspinal mass is seen. Vascular calcifications are seen compatible with atherosclerotic disease. No acute bony abnormalities are noted.  Multilevel lumbar spondylosis, facet arthropathy and degenerative disc disease as described above. Mild-to-moderate acquired spinal stenosis at L1-2, L2-3 and L3-4. Grade 1 spondylolisthesis of L5 on S1 and L4 with respect to L3. Spondylolysis at L4 and L5. RECOMMENDATIONS: Further evaluation may be obtained with MR imaging if clinically indicated. CT PELVIS WO CONTRAST Additional Contrast? None    Result Date: 10/9/2021  EXAMINATION: CT OF THE PELVIS WITHOUT CONTRAST 10/9/2021 11:00 am TECHNIQUE: CT of the pelvis was performed without the administration of intravenous contrast.  Multiplanar reformatted images are provided for review. Adjustment of mA and/or kV according to patient size was utilized. Dose modulation, iterative reconstruction, and/or weight based adjustment of the mA/kV was utilized to reduce the radiation dose to as low as reasonably achievable. COMPARISON: Right hip radiograph, 10/09/2021 HISTORY: ORDERING SYSTEM PROVIDED HISTORY: R sided hip pain severe persistent TECHNOLOGIST PROVIDED HISTORY: R sided hip pain severe persistent Decision Support Exception - unselect if not a suspected or confirmed emergency medical condition->Emergency Medical Condition (MA) Reason for Exam: Pt states felt \"popping\" sensation after bending down this morning; c/o right lower back and hip pain. No prior surgery Acuity: Acute Type of Exam: Initial FINDINGS: The pelvic and obturator rings are intact. The SI joints and pubic symphysis are congruent. Evaluation of the right hip shows no stress, insufficiency, or traumatic fracture. There are mild degenerative changes, characterized by joint space loss and mild osteophytosis. No joint effusion is found. There is relatively diffuse atrophy involving all the muscles of the right side of the pelvis, but especially of the proximal quadriceps musculature. Evaluation of the left hip also shows no stress, insufficiency, or traumatic fractures. No dislocation. No joint effusion. Regional muscles are unremarkable.  Limited evaluation of DO LAURA Hartley  Number of Diagnoses or Management Options  Hip strain, right, initial encounter  Diagnosis management comments: 59-year-old with right hip pain after bending over and feeling a pop. Initial evaluation uncomfortable. Hypertension noted. No step-offs or deformities. No crepitus. Distally neurovascularly intact. Plain films obtained which were negative, however patient given Percocet and was having severe pain after just plain films. Given IM fentanyl, CTs ordered due to what I felt was like pain out of proportion to negative films. CT of the lumbar spine as well as pelvis were both negative. Patient ambulated with the assistance of a walker without any difficulty. Will follow up with orthopedic surgery as outpatient, prescription for Percocet. Wife and patient both agree that they have a walker at home that he can use temporarily while he is uncomfortable. Amount and/or Complexity of Data Reviewed  Tests in the radiology section of CPT®: ordered and reviewed  Review and summarize past medical records: yes  Independent visualization of images, tracings, or specimens: yes    Patient Progress  Patient progress: stable      PROCEDURES:  Procedures     CONSULTS:  None    CRITICAL CARE:  NONE    FINAL IMPRESSION     1. Hip strain, right, initial encounter      DISPOSITION / PLAN   DISPOSITION Decision To Discharge 10/09/2021 03:22:19 PM      Evaluation and treatment course in the ED, and plan of care upon discharge was discussed in length with the patient. Patient had no further questions prior to being discharged and was instructed to return to the ED for new or worsening symptoms. Any changes to existing medications or new prescriptions were reviewed with patient and they expressed understanding of how to correctly take their medications and the possible side effects.     PATIENT REFERRED TO:  [facility]  [address] [facility]  [phone]    As needed, If symptoms worsen    Guanakito Galvan MD  [address] [place], [place] [address]y [phone] [facility]  [phone]            DISCHARGE MEDICATIONS:  New Prescriptions    OXYCODONE-ACETAMINOPHEN (PERCOCET) 5-325 MG PER TABLET    Take 1 tablet by mouth every 6 hours as needed for Pain for up to 3 days. Intended supply: 3 days. Take lowest dose possible to manage pain       Jocelyne Spencer DO  Emergency Medicine Physician    (Please note that portions of this note were completed with a voice recognition program.  Efforts were made to edit the dictations but occasionally words are mis-transcribed.)        Verito Ozuna 1721, DO  10/09/21 7421

## 2021-10-09 NOTE — ED NOTES
Pt to er with c/o right hip pain. Pt states he was bending over earlier today and felt his hip \"pop\" and had pain. Pt states he normally ambulates without assistive devices. Pt denies prior injuries or surgeries to hip. Pt a&ox3. Skin warm and dry. Respirations even and non-labored.       Sharon Jiménez RN  10/09/21 3098

## 2021-10-22 ENCOUNTER — TELEPHONE (OUTPATIENT)
Dept: ORTHOPEDIC SURGERY | Age: 76
End: 2021-10-22

## 2021-10-22 NOTE — TELEPHONE ENCOUNTER
----- Message from Stephanie Eugene sent at 10/14/2021  2:23 PM EDT -----  LVM for patient to call.  ----- Message -----  From: Enrique Mercer MD  Sent: 10/11/2021   8:31 AM EDT  To: Hector Mandujano RN, Stephanie Eugene, #    Hi,    I have reviewed this patient's cc'd chart and would like this patient to be scheduled for my clinic. Please let me know if there are any issues with this or any barriers to accommodating this request. Thanks in advance!      Sincerely,    Osmel Mejia MD  Orthopedic Surgery    ----- Message -----  From: Verito Ozuna 1721, DO  Sent: 10/9/2021   5:04 PM EDT  To: Enrique Mercer MD

## 2024-03-12 RX ORDER — AMIODARONE HYDROCHLORIDE 200 MG/1
200 TABLET ORAL 2 TIMES DAILY
COMMUNITY

## 2024-03-12 RX ORDER — TRAMADOL HYDROCHLORIDE 50 MG/1
50 TABLET ORAL EVERY 12 HOURS PRN
COMMUNITY

## 2024-03-15 ENCOUNTER — APPOINTMENT (OUTPATIENT)
Age: 79
End: 2024-03-15
Attending: INTERNAL MEDICINE
Payer: MEDICARE

## 2024-03-15 ENCOUNTER — HOSPITAL ENCOUNTER (OUTPATIENT)
Age: 79
Discharge: HOME | End: 2024-03-15
Attending: INTERNAL MEDICINE | Admitting: INTERNAL MEDICINE
Payer: MEDICARE

## 2024-03-15 VITALS
WEIGHT: 232 LBS | TEMPERATURE: 97.2 F | HEIGHT: 69 IN | OXYGEN SATURATION: 96 % | HEART RATE: 78 BPM | BODY MASS INDEX: 34.36 KG/M2 | RESPIRATION RATE: 16 BRPM | DIASTOLIC BLOOD PRESSURE: 76 MMHG | SYSTOLIC BLOOD PRESSURE: 129 MMHG

## 2024-03-15 DIAGNOSIS — I48.0 PAROXYSMAL ATRIAL FIBRILLATION (HCC): Primary | ICD-10-CM

## 2024-03-15 DIAGNOSIS — I25.10 CORONARY ARTERY DISEASE INVOLVING NATIVE CORONARY ARTERY OF NATIVE HEART WITHOUT ANGINA PECTORIS: ICD-10-CM

## 2024-03-15 LAB
ECHO BSA: 2.26 M2
GLUCOSE BLD-MCNC: 110 MG/DL (ref 75–110)

## 2024-03-15 PROCEDURE — 93312 ECHO TRANSESOPHAGEAL: CPT

## 2024-03-15 PROCEDURE — 92960 CARDIOVERSION ELECTRIC EXT: CPT

## 2024-03-15 PROCEDURE — 6360000002 HC RX W HCPCS: Performed by: INTERNAL MEDICINE

## 2024-03-15 PROCEDURE — 7100000011 HC PHASE II RECOVERY - ADDTL 15 MIN: Performed by: INTERNAL MEDICINE

## 2024-03-15 PROCEDURE — 7100000010 HC PHASE II RECOVERY - FIRST 15 MIN: Performed by: INTERNAL MEDICINE

## 2024-03-15 PROCEDURE — 99153 MOD SED SAME PHYS/QHP EA: CPT | Performed by: INTERNAL MEDICINE

## 2024-03-15 PROCEDURE — 82947 ASSAY GLUCOSE BLOOD QUANT: CPT

## 2024-03-15 PROCEDURE — 2580000003 HC RX 258: Performed by: INTERNAL MEDICINE

## 2024-03-15 PROCEDURE — 93005 ELECTROCARDIOGRAM TRACING: CPT | Performed by: INTERNAL MEDICINE

## 2024-03-15 PROCEDURE — 6370000000 HC RX 637 (ALT 250 FOR IP): Performed by: INTERNAL MEDICINE

## 2024-03-15 PROCEDURE — 99152 MOD SED SAME PHYS/QHP 5/>YRS: CPT | Performed by: INTERNAL MEDICINE

## 2024-03-15 RX ORDER — LIDOCAINE HYDROCHLORIDE 20 MG/ML
SOLUTION OROPHARYNGEAL PRN
Status: COMPLETED | OUTPATIENT
Start: 2024-03-15 | End: 2024-03-15

## 2024-03-15 RX ORDER — SODIUM CHLORIDE 9 MG/ML
INJECTION, SOLUTION INTRAVENOUS CONTINUOUS
Status: DISCONTINUED | OUTPATIENT
Start: 2024-03-15 | End: 2024-03-15 | Stop reason: HOSPADM

## 2024-03-15 RX ORDER — FENTANYL CITRATE 50 UG/ML
INJECTION, SOLUTION INTRAMUSCULAR; INTRAVENOUS PRN
Status: COMPLETED | OUTPATIENT
Start: 2024-03-15 | End: 2024-03-15

## 2024-03-15 RX ORDER — MIDAZOLAM HYDROCHLORIDE 1 MG/ML
INJECTION INTRAMUSCULAR; INTRAVENOUS PRN
Status: COMPLETED | OUTPATIENT
Start: 2024-03-15 | End: 2024-03-15

## 2024-03-15 RX ADMIN — FENTANYL CITRATE 25 MCG: 50 INJECTION INTRAMUSCULAR; INTRAVENOUS at 12:51

## 2024-03-15 RX ADMIN — MIDAZOLAM 1 MG: 1 INJECTION INTRAMUSCULAR; INTRAVENOUS at 12:58

## 2024-03-15 RX ADMIN — MIDAZOLAM 1 MG: 1 INJECTION INTRAMUSCULAR; INTRAVENOUS at 12:51

## 2024-03-15 RX ADMIN — FENTANYL CITRATE 25 MCG: 50 INJECTION INTRAMUSCULAR; INTRAVENOUS at 12:42

## 2024-03-15 RX ADMIN — SODIUM CHLORIDE: 9 INJECTION, SOLUTION INTRAVENOUS at 11:55

## 2024-03-15 RX ADMIN — MIDAZOLAM 2 MG: 1 INJECTION INTRAMUSCULAR; INTRAVENOUS at 12:40

## 2024-03-15 RX ADMIN — FENTANYL CITRATE 25 MCG: 50 INJECTION INTRAMUSCULAR; INTRAVENOUS at 12:58

## 2024-03-15 RX ADMIN — BENZOCAINE, BUTAMBEN, AND TETRACAINE HYDROCHLORIDE 2 SPRAY: .028; .004; .004 AEROSOL, SPRAY TOPICAL at 12:38

## 2024-03-15 RX ADMIN — FENTANYL CITRATE 50 MCG: 50 INJECTION INTRAMUSCULAR; INTRAVENOUS at 12:40

## 2024-03-15 RX ADMIN — LIDOCAINE HYDROCHLORIDE 15 ML: 20 SOLUTION ORAL at 12:38

## 2024-03-15 RX ADMIN — MIDAZOLAM 1 MG: 1 INJECTION INTRAMUSCULAR; INTRAVENOUS at 12:42

## 2024-03-15 ASSESSMENT — PAIN - FUNCTIONAL ASSESSMENT: PAIN_FUNCTIONAL_ASSESSMENT: 0-10

## 2024-03-15 NOTE — PROCEDURES
Cardioversion Procedure Note    Indication: Atrial fibrillation    Procedure: Synchronized electrical cardioversion    Consent: The patient was counseled regarding the procedure, its indications, risks, potential complications and alternatives, and any questions were answered. Consent was obtained to proceed.    Pre-Medication: Patient received 5 mg of Versed and 125 mcg of Fentanyl for the ARNAUD which was just done and showed no intracardiac clots.    Procedure: The patient was placed in the supine position and the chest area was exposed. The cardioversion pads were applied in the standard manner and configuration, the defibrillator was set on the synchronous mode and charged to 200 joules, and synchronized shock was delivered with successful termination of atrial fibrillation and sinus rhythm was restored.    The patient tolerated the procedure well and is currently recovering from anesthesia.    Complications: None    Conclusion: Successful synchronized electrial cardioversion wiith restoration of sinus rhythm    Plan:   -Continue amiodarone 200 mg daily and metoprolol 25 mg twice a day for rate and rhythm control  -Continue apixaban for anticoagulation    .  Geoffrey Mejaí MD

## 2024-03-15 NOTE — INTERVAL H&P NOTE
Update History & Physical    The patient's History and Physical of March 6, 2024 was reviewed with the patient and I examined the patient. There was no change. The surgical site was confirmed by the patient and me.     Plan: The risks, benefits, expected outcome, and alternative to the recommended procedure have been discussed with the patient. Patient understands and wants to proceed with the procedure.     Electronically signed by CELSO ARREDONDO MD on 3/15/2024 at 1:21 PM

## 2024-03-16 LAB
EKG ATRIAL RATE: 394 BPM
EKG Q-T INTERVAL: 364 MS
EKG QRS DURATION: 88 MS
EKG QTC CALCULATION (BAZETT): 464 MS
EKG R AXIS: 24 DEGREES
EKG T AXIS: -34 DEGREES
EKG VENTRICULAR RATE: 98 BPM

## 2024-03-16 PROCEDURE — 93010 ELECTROCARDIOGRAM REPORT: CPT | Performed by: INTERNAL MEDICINE

## 2024-05-15 ENCOUNTER — APPOINTMENT (OUTPATIENT)
Dept: CT IMAGING | Age: 79
End: 2024-05-15
Payer: MEDICARE

## 2024-05-15 ENCOUNTER — APPOINTMENT (OUTPATIENT)
Dept: GENERAL RADIOLOGY | Age: 79
End: 2024-05-15
Payer: MEDICARE

## 2024-05-15 ENCOUNTER — HOSPITAL ENCOUNTER (INPATIENT)
Age: 79
LOS: 2 days | Discharge: HOME HEALTH | End: 2024-05-17
Attending: EMERGENCY MEDICINE | Admitting: HOSPITALIST
Payer: MEDICARE

## 2024-05-15 DIAGNOSIS — I48.91 ATRIAL FIBRILLATION WITH RVR (HCC): ICD-10-CM

## 2024-05-15 DIAGNOSIS — R41.82 ALTERED MENTAL STATUS, UNSPECIFIED ALTERED MENTAL STATUS TYPE: Primary | ICD-10-CM

## 2024-05-15 DIAGNOSIS — F80.9 DELAYED SPEECH: ICD-10-CM

## 2024-05-15 PROBLEM — R47.01 APHASIA: Status: ACTIVE | Noted: 2024-05-15

## 2024-05-15 LAB
ALBUMIN SERPL-MCNC: 4 G/DL (ref 3.5–5.2)
ALP SERPL-CCNC: 77 U/L (ref 40–129)
ALT SERPL-CCNC: 9 U/L (ref 5–41)
ANION GAP SERPL CALCULATED.3IONS-SCNC: 12 MMOL/L (ref 9–17)
ANTI-XA UNFRAC HEPARIN: <0.1 IU/L (ref 0.3–0.7)
AST SERPL-CCNC: 15 U/L
BASOPHILS # BLD: 0.05 K/UL (ref 0–0.2)
BASOPHILS NFR BLD: 1 % (ref 0–2)
BILIRUB SERPL-MCNC: 0.8 MG/DL (ref 0.3–1.2)
BILIRUB UR QL STRIP: NEGATIVE
BUN SERPL-MCNC: 10 MG/DL (ref 8–23)
BUN/CREAT SERPL: 11 (ref 9–20)
CALCIUM SERPL-MCNC: 8.9 MG/DL (ref 8.6–10.4)
CHLORIDE SERPL-SCNC: 102 MMOL/L (ref 98–107)
CLARITY UR: CLEAR
CO2 SERPL-SCNC: 25 MMOL/L (ref 20–31)
COLOR UR: YELLOW
CREAT SERPL-MCNC: 0.9 MG/DL (ref 0.7–1.2)
EOSINOPHIL # BLD: 0.21 K/UL (ref 0–0.44)
EOSINOPHILS RELATIVE PERCENT: 3 % (ref 1–4)
ERYTHROCYTE [DISTWIDTH] IN BLOOD BY AUTOMATED COUNT: 14.6 % (ref 11.8–14.4)
GFR, ESTIMATED: 87 ML/MIN/1.73M2
GLUCOSE BLD-MCNC: 116 MG/DL (ref 75–110)
GLUCOSE BLD-MCNC: 154 MG/DL (ref 75–110)
GLUCOSE SERPL-MCNC: 149 MG/DL (ref 70–99)
GLUCOSE UR STRIP-MCNC: NEGATIVE MG/DL
HCT VFR BLD AUTO: 45.9 % (ref 40.7–50.3)
HGB BLD-MCNC: 14.4 G/DL (ref 13–17)
HGB UR QL STRIP.AUTO: NEGATIVE
IMM GRANULOCYTES # BLD AUTO: 0.02 K/UL (ref 0–0.3)
IMM GRANULOCYTES NFR BLD: 0 %
INR PPP: 1.1
KETONES UR STRIP-MCNC: NEGATIVE MG/DL
LEUKOCYTE ESTERASE UR QL STRIP: NEGATIVE
LYMPHOCYTES NFR BLD: 1.99 K/UL (ref 1.1–3.7)
LYMPHOCYTES RELATIVE PERCENT: 24 % (ref 24–43)
MCH RBC QN AUTO: 28.3 PG (ref 25.2–33.5)
MCHC RBC AUTO-ENTMCNC: 31.4 G/DL (ref 28.4–34.8)
MCV RBC AUTO: 90.4 FL (ref 82.6–102.9)
MONOCYTES NFR BLD: 0.82 K/UL (ref 0.1–1.2)
MONOCYTES NFR BLD: 10 % (ref 3–12)
NEUTROPHILS NFR BLD: 62 % (ref 36–65)
NEUTS SEG NFR BLD: 5.13 K/UL (ref 1.5–8.1)
NITRITE UR QL STRIP: NEGATIVE
NRBC BLD-RTO: 0 PER 100 WBC
PARTIAL THROMBOPLASTIN TIME: 26.3 SEC (ref 23.9–33.8)
PH UR STRIP: 6 [PH] (ref 5–8)
PLATELET # BLD AUTO: 173 K/UL (ref 138–453)
PMV BLD AUTO: 8.9 FL (ref 8.1–13.5)
POTASSIUM SERPL-SCNC: 4.3 MMOL/L (ref 3.7–5.3)
PROT SERPL-MCNC: 7.3 G/DL (ref 6.4–8.3)
PROT UR STRIP-MCNC: NEGATIVE MG/DL
PROTHROMBIN TIME: 14.5 SEC (ref 11.5–14.2)
RBC # BLD AUTO: 5.08 M/UL (ref 4.21–5.77)
RBC # BLD: ABNORMAL 10*6/UL
SODIUM SERPL-SCNC: 139 MMOL/L (ref 135–144)
SP GR UR STRIP: 1.01 (ref 1–1.03)
TROPONIN I SERPL HS-MCNC: 21 NG/L (ref 0–22)
UROBILINOGEN UR STRIP-ACNC: NORMAL EU/DL (ref 0–1)
WBC OTHER # BLD: 8.2 K/UL (ref 3.5–11.3)

## 2024-05-15 PROCEDURE — 6370000000 HC RX 637 (ALT 250 FOR IP): Performed by: NURSE PRACTITIONER

## 2024-05-15 PROCEDURE — 85520 HEPARIN ASSAY: CPT

## 2024-05-15 PROCEDURE — 85610 PROTHROMBIN TIME: CPT

## 2024-05-15 PROCEDURE — 81003 URINALYSIS AUTO W/O SCOPE: CPT

## 2024-05-15 PROCEDURE — 85730 THROMBOPLASTIN TIME PARTIAL: CPT

## 2024-05-15 PROCEDURE — 99285 EMERGENCY DEPT VISIT HI MDM: CPT

## 2024-05-15 PROCEDURE — 93005 ELECTROCARDIOGRAM TRACING: CPT | Performed by: EMERGENCY MEDICINE

## 2024-05-15 PROCEDURE — 2580000003 HC RX 258: Performed by: EMERGENCY MEDICINE

## 2024-05-15 PROCEDURE — 96374 THER/PROPH/DIAG INJ IV PUSH: CPT

## 2024-05-15 PROCEDURE — 2060000000 HC ICU INTERMEDIATE R&B

## 2024-05-15 PROCEDURE — 85025 COMPLETE CBC W/AUTO DIFF WBC: CPT

## 2024-05-15 PROCEDURE — 71045 X-RAY EXAM CHEST 1 VIEW: CPT

## 2024-05-15 PROCEDURE — 2580000003 HC RX 258: Performed by: NURSE PRACTITIONER

## 2024-05-15 PROCEDURE — 82947 ASSAY GLUCOSE BLOOD QUANT: CPT

## 2024-05-15 PROCEDURE — 2500000003 HC RX 250 WO HCPCS: Performed by: EMERGENCY MEDICINE

## 2024-05-15 PROCEDURE — 99447 NTRPROF PH1/NTRNET/EHR 11-20: CPT | Performed by: PSYCHIATRY & NEUROLOGY

## 2024-05-15 PROCEDURE — 70450 CT HEAD/BRAIN W/O DYE: CPT

## 2024-05-15 PROCEDURE — 84484 ASSAY OF TROPONIN QUANT: CPT

## 2024-05-15 PROCEDURE — 6360000002 HC RX W HCPCS: Performed by: EMERGENCY MEDICINE

## 2024-05-15 PROCEDURE — 80053 COMPREHEN METABOLIC PANEL: CPT

## 2024-05-15 PROCEDURE — 6360000004 HC RX CONTRAST MEDICATION: Performed by: EMERGENCY MEDICINE

## 2024-05-15 PROCEDURE — 94761 N-INVAS EAR/PLS OXIMETRY MLT: CPT

## 2024-05-15 PROCEDURE — 70498 CT ANGIOGRAPHY NECK: CPT

## 2024-05-15 RX ORDER — HEPARIN SODIUM 10000 [USP'U]/100ML
5-30 INJECTION, SOLUTION INTRAVENOUS CONTINUOUS
Status: DISCONTINUED | OUTPATIENT
Start: 2024-05-15 | End: 2024-05-16

## 2024-05-15 RX ORDER — SODIUM CHLORIDE 9 MG/ML
INJECTION, SOLUTION INTRAVENOUS PRN
Status: DISCONTINUED | OUTPATIENT
Start: 2024-05-15 | End: 2024-05-17 | Stop reason: HOSPADM

## 2024-05-15 RX ORDER — ONDANSETRON 2 MG/ML
4 INJECTION INTRAMUSCULAR; INTRAVENOUS EVERY 6 HOURS PRN
Status: DISCONTINUED | OUTPATIENT
Start: 2024-05-15 | End: 2024-05-17 | Stop reason: HOSPADM

## 2024-05-15 RX ORDER — MAGNESIUM SULFATE IN WATER 40 MG/ML
2000 INJECTION, SOLUTION INTRAVENOUS PRN
Status: DISCONTINUED | OUTPATIENT
Start: 2024-05-15 | End: 2024-05-17 | Stop reason: HOSPADM

## 2024-05-15 RX ORDER — 0.9 % SODIUM CHLORIDE 0.9 %
100 INTRAVENOUS SOLUTION INTRAVENOUS ONCE
Status: COMPLETED | OUTPATIENT
Start: 2024-05-15 | End: 2024-05-15

## 2024-05-15 RX ORDER — SODIUM CHLORIDE 0.9 % (FLUSH) 0.9 %
10 SYRINGE (ML) INJECTION PRN
Status: DISCONTINUED | OUTPATIENT
Start: 2024-05-15 | End: 2024-05-17 | Stop reason: HOSPADM

## 2024-05-15 RX ORDER — SODIUM CHLORIDE 0.9 % (FLUSH) 0.9 %
10 SYRINGE (ML) INJECTION EVERY 12 HOURS SCHEDULED
Status: DISCONTINUED | OUTPATIENT
Start: 2024-05-15 | End: 2024-05-17 | Stop reason: HOSPADM

## 2024-05-15 RX ORDER — FAMOTIDINE 20 MG/1
20 TABLET, FILM COATED ORAL 2 TIMES DAILY
Status: DISCONTINUED | OUTPATIENT
Start: 2024-05-15 | End: 2024-05-17 | Stop reason: HOSPADM

## 2024-05-15 RX ORDER — DEXTROSE MONOHYDRATE 100 MG/ML
INJECTION, SOLUTION INTRAVENOUS CONTINUOUS PRN
Status: DISCONTINUED | OUTPATIENT
Start: 2024-05-15 | End: 2024-05-17 | Stop reason: HOSPADM

## 2024-05-15 RX ORDER — INSULIN LISPRO 100 [IU]/ML
0-16 INJECTION, SOLUTION INTRAVENOUS; SUBCUTANEOUS
Status: DISCONTINUED | OUTPATIENT
Start: 2024-05-16 | End: 2024-05-17 | Stop reason: HOSPADM

## 2024-05-15 RX ORDER — INSULIN LISPRO 100 [IU]/ML
0-4 INJECTION, SOLUTION INTRAVENOUS; SUBCUTANEOUS NIGHTLY
Status: DISCONTINUED | OUTPATIENT
Start: 2024-05-15 | End: 2024-05-17 | Stop reason: HOSPADM

## 2024-05-15 RX ORDER — ACETAMINOPHEN 650 MG/1
650 SUPPOSITORY RECTAL EVERY 6 HOURS PRN
Status: DISCONTINUED | OUTPATIENT
Start: 2024-05-15 | End: 2024-05-17 | Stop reason: HOSPADM

## 2024-05-15 RX ORDER — ONDANSETRON 4 MG/1
4 TABLET, ORALLY DISINTEGRATING ORAL EVERY 8 HOURS PRN
Status: DISCONTINUED | OUTPATIENT
Start: 2024-05-15 | End: 2024-05-17 | Stop reason: HOSPADM

## 2024-05-15 RX ORDER — POTASSIUM CHLORIDE 20 MEQ/1
40 TABLET, EXTENDED RELEASE ORAL PRN
Status: DISCONTINUED | OUTPATIENT
Start: 2024-05-15 | End: 2024-05-17 | Stop reason: HOSPADM

## 2024-05-15 RX ORDER — DILTIAZEM HYDROCHLORIDE 5 MG/ML
15 INJECTION INTRAVENOUS ONCE
Status: COMPLETED | OUTPATIENT
Start: 2024-05-15 | End: 2024-05-15

## 2024-05-15 RX ORDER — POTASSIUM CHLORIDE 7.45 MG/ML
10 INJECTION INTRAVENOUS PRN
Status: DISCONTINUED | OUTPATIENT
Start: 2024-05-15 | End: 2024-05-17 | Stop reason: HOSPADM

## 2024-05-15 RX ORDER — 0.9 % SODIUM CHLORIDE 0.9 %
1000 INTRAVENOUS SOLUTION INTRAVENOUS ONCE
Status: COMPLETED | OUTPATIENT
Start: 2024-05-15 | End: 2024-05-15

## 2024-05-15 RX ORDER — ACETAMINOPHEN 325 MG/1
650 TABLET ORAL EVERY 6 HOURS PRN
Status: DISCONTINUED | OUTPATIENT
Start: 2024-05-15 | End: 2024-05-17 | Stop reason: HOSPADM

## 2024-05-15 RX ADMIN — SODIUM CHLORIDE, PRESERVATIVE FREE 10 ML: 5 INJECTION INTRAVENOUS at 18:53

## 2024-05-15 RX ADMIN — IOPAMIDOL 75 ML: 755 INJECTION, SOLUTION INTRAVENOUS at 18:53

## 2024-05-15 RX ADMIN — FAMOTIDINE 20 MG: 20 TABLET, FILM COATED ORAL at 23:44

## 2024-05-15 RX ADMIN — SODIUM CHLORIDE, PRESERVATIVE FREE 10 ML: 5 INJECTION INTRAVENOUS at 23:44

## 2024-05-15 RX ADMIN — DILTIAZEM HYDROCHLORIDE 15 MG: 5 INJECTION, SOLUTION INTRAVENOUS at 19:22

## 2024-05-15 RX ADMIN — SODIUM CHLORIDE 100 ML: 0.9 INJECTION, SOLUTION INTRAVENOUS at 18:53

## 2024-05-15 RX ADMIN — SODIUM CHLORIDE 1000 ML: 9 INJECTION, SOLUTION INTRAVENOUS at 19:21

## 2024-05-15 RX ADMIN — HEPARIN SODIUM 9 UNITS/KG/HR: 10000 INJECTION, SOLUTION INTRAVENOUS at 21:08

## 2024-05-15 ASSESSMENT — ENCOUNTER SYMPTOMS
VOMITING: 0
COLOR CHANGE: 0
COUGH: 0
TROUBLE SWALLOWING: 0
NAUSEA: 0
SHORTNESS OF BREATH: 0
DIARRHEA: 0
ABDOMINAL PAIN: 0

## 2024-05-15 NOTE — ED PROVIDER NOTES
EMERGENCY DEPARTMENT ENCOUNTER    Pt Name: Manjinder Hussein  MRN: [id number]  Birthdate 1945  Date of evaluation: 5/15/24  CHIEF COMPLAINT       Chief Complaint   Patient presents with    Altered Mental Status     Px arrives complaining of feeling \"foggy\" with thoughts. Px states that earlier around 0900 of he experienced stroke like symptoms that passed a few hours later. EMS states on arrival px was found to be in afib RVR.      HISTORY OF PRESENT ILLNESS   79-year-old male presenting to the ER with symptoms of confusion as well as weakness.  The patient's symptoms started at 9 AM this morning.  The patient does have an underlying history of atrial fibrillation and is unsure if he is utilizing Eliquis at home.  It is marked in the chart as at home medication.    The history is provided by the patient.   Altered Mental Status  Presenting symptoms: confusion    Severity:  Moderate  Most recent episode:  Today  Duration:  10 hours  Associated symptoms: weakness    Associated symptoms: no abdominal pain, no agitation, no fever, no nausea, no palpitations, no rash and no vomiting            REVIEW OF SYSTEMS     Review of Systems   Constitutional:  Negative for activity change, fatigue and fever.   HENT:  Negative for congestion, ear pain and trouble swallowing.    Eyes:  Negative for photophobia and visual disturbance.   Respiratory:  Negative for cough and shortness of breath.    Cardiovascular:  Negative for chest pain and palpitations.   Gastrointestinal:  Negative for abdominal pain, diarrhea, nausea and vomiting.   Genitourinary:  Negative for dysuria, flank pain and urgency.   Musculoskeletal:  Negative for arthralgias and myalgias.   Skin:  Negative for color change and rash.   Neurological:  Positive for weakness. Negative for dizziness and facial asymmetry.   Psychiatric/Behavioral:  Positive for confusion. Negative for agitation and behavioral problems.      PASTMEDICAL HISTORY     Past Medical History:  95% 95% 95% 94%   Weight:       Height:         MEDICATIONS GIVEN TO PATIENT THIS ENCOUNTER:  Orders Placed This Encounter   Medications    sodium chloride 0.9 % bolus 100 mL    iopamidol (ISOVUE-370) 76 % injection 75 mL    sodium chloride flush 0.9 % injection 10 mL    sodium chloride 0.9 % bolus 1,000 mL    dilTIAZem injection 15 mg    dilTIAZem 125 mg in sodium chloride 0.9 % 125 mL infusion     Order Specific Question:   Titrate Infusion?     Answer:   Yes     Order Specific Question:   Initial Infusion Dose:     Answer:   5 mg/hr     Order Specific Question:   Goal of Therapy is:     Answer:   HR less than 120 bpm     Order Specific Question:   Contact Provider if:     Answer:   SBP less than 90 mmHg     Order Specific Question:   Contact Provider if:     Answer:   HR less than 60 bpm     Order Specific Question:   HOLD for     Answer:   SBP less than 90 mmHg     Order Specific Question:   HOLD for     Answer:   HR less than 60 bpm    heparin 25,000 units in dextrose 5% 250 mL (premix) infusion     DISCHARGE PRESCRIPTIONS:  New Prescriptions    No medications on file     PHYSICIAN CONSULTS ORDERED THIS ENCOUNTER:  IP CONSULT TO INTERNAL MEDICINE  IP CONSULT TO CARDIOLOGY  IP CONSULT TO NEUROLOGY  FINAL IMPRESSION      1. Altered mental status, unspecified altered mental status type    2. Delayed speech    3. Atrial fibrillation with RVR (HCC)          DISPOSITION/PLAN   DISPOSITION Admitted 05/15/2024 08:41:41 PM      OUTPATIENT FOLLOW UP THE PATIENT:  No follow-up provider specified.    DO Castro Tucker Sami, DO  05/15/24 2049

## 2024-05-15 NOTE — ED NOTES
Stroke Alert called @ 1831 via [employer].  [facility] Access called @ 1831 to initiate case.  Telestroke placed outside of room w/ completion of call.

## 2024-05-15 NOTE — ED NOTES
Px placed back in room. Px portable neuro monitor plugged in and px positioned comfortably. Px expresses no questions or concerns at this time

## 2024-05-16 ENCOUNTER — APPOINTMENT (OUTPATIENT)
Dept: MRI IMAGING | Age: 79
End: 2024-05-16
Payer: MEDICARE

## 2024-05-16 LAB
ANION GAP SERPL CALCULATED.3IONS-SCNC: 13 MMOL/L (ref 9–17)
ANTI-XA UNFRAC HEPARIN: 0.31 IU/L (ref 0.3–0.7)
ANTI-XA UNFRAC HEPARIN: <0.1 IU/L (ref 0.3–0.7)
BASOPHILS # BLD: 0.04 K/UL (ref 0–0.2)
BASOPHILS NFR BLD: 1 % (ref 0–2)
BUN SERPL-MCNC: 11 MG/DL (ref 8–23)
BUN/CREAT SERPL: 14 (ref 9–20)
CALCIUM SERPL-MCNC: 8.5 MG/DL (ref 8.6–10.4)
CHLORIDE SERPL-SCNC: 105 MMOL/L (ref 98–107)
CHOLEST SERPL-MCNC: 180 MG/DL (ref 0–199)
CHOLESTEROL/HDL RATIO: 4
CO2 SERPL-SCNC: 21 MMOL/L (ref 20–31)
CREAT SERPL-MCNC: 0.8 MG/DL (ref 0.7–1.2)
EKG ATRIAL RATE: 159 BPM
EKG Q-T INTERVAL: 330 MS
EKG QRS DURATION: 78 MS
EKG QTC CALCULATION (BAZETT): 483 MS
EKG R AXIS: 41 DEGREES
EKG T AXIS: -34 DEGREES
EKG VENTRICULAR RATE: 129 BPM
EOSINOPHIL # BLD: 0.2 K/UL (ref 0–0.44)
EOSINOPHILS RELATIVE PERCENT: 3 % (ref 1–4)
ERYTHROCYTE [DISTWIDTH] IN BLOOD BY AUTOMATED COUNT: 14.7 % (ref 11.8–14.4)
EST. AVERAGE GLUCOSE BLD GHB EST-MCNC: 143 MG/DL
GFR, ESTIMATED: >90 ML/MIN/1.73M2
GLUCOSE BLD-MCNC: 116 MG/DL (ref 75–110)
GLUCOSE BLD-MCNC: 132 MG/DL (ref 75–110)
GLUCOSE BLD-MCNC: 138 MG/DL (ref 75–110)
GLUCOSE BLD-MCNC: 140 MG/DL (ref 75–110)
GLUCOSE SERPL-MCNC: 115 MG/DL (ref 70–99)
HBA1C MFR BLD: 6.6 % (ref 4–6)
HCT VFR BLD AUTO: 42.3 % (ref 40.7–50.3)
HDLC SERPL-MCNC: 43 MG/DL
HGB BLD-MCNC: 12.9 G/DL (ref 13–17)
IMM GRANULOCYTES # BLD AUTO: 0.02 K/UL (ref 0–0.3)
IMM GRANULOCYTES NFR BLD: 0 %
LDLC SERPL CALC-MCNC: 116 MG/DL (ref 0–100)
LYMPHOCYTES NFR BLD: 2.16 K/UL (ref 1.1–3.7)
LYMPHOCYTES RELATIVE PERCENT: 27 % (ref 24–43)
MAGNESIUM SERPL-MCNC: 2 MG/DL (ref 1.6–2.6)
MCH RBC QN AUTO: 27.9 PG (ref 25.2–33.5)
MCHC RBC AUTO-ENTMCNC: 30.5 G/DL (ref 28.4–34.8)
MCV RBC AUTO: 91.4 FL (ref 82.6–102.9)
MONOCYTES NFR BLD: 0.72 K/UL (ref 0.1–1.2)
MONOCYTES NFR BLD: 9 % (ref 3–12)
NEUTROPHILS NFR BLD: 60 % (ref 36–65)
NEUTS SEG NFR BLD: 4.78 K/UL (ref 1.5–8.1)
NRBC BLD-RTO: 0 PER 100 WBC
PLATELET # BLD AUTO: 154 K/UL (ref 138–453)
PMV BLD AUTO: 8.9 FL (ref 8.1–13.5)
POTASSIUM SERPL-SCNC: 4 MMOL/L (ref 3.7–5.3)
RBC # BLD AUTO: 4.63 M/UL (ref 4.21–5.77)
RBC # BLD: ABNORMAL 10*6/UL
SODIUM SERPL-SCNC: 139 MMOL/L (ref 135–144)
TRIGL SERPL-MCNC: 104 MG/DL
TSH SERPL DL<=0.05 MIU/L-ACNC: 2.27 UIU/ML (ref 0.3–5)
VLDLC SERPL CALC-MCNC: 21 MG/DL
WBC OTHER # BLD: 7.9 K/UL (ref 3.5–11.3)

## 2024-05-16 PROCEDURE — 2060000000 HC ICU INTERMEDIATE R&B

## 2024-05-16 PROCEDURE — 97535 SELF CARE MNGMENT TRAINING: CPT

## 2024-05-16 PROCEDURE — 97129 THER IVNTJ 1ST 15 MIN: CPT

## 2024-05-16 PROCEDURE — 6370000000 HC RX 637 (ALT 250 FOR IP): Performed by: NURSE PRACTITIONER

## 2024-05-16 PROCEDURE — 2580000003 HC RX 258: Performed by: INTERNAL MEDICINE

## 2024-05-16 PROCEDURE — 99223 1ST HOSP IP/OBS HIGH 75: CPT | Performed by: PSYCHIATRY & NEUROLOGY

## 2024-05-16 PROCEDURE — 83036 HEMOGLOBIN GLYCOSYLATED A1C: CPT

## 2024-05-16 PROCEDURE — 97166 OT EVAL MOD COMPLEX 45 MIN: CPT

## 2024-05-16 PROCEDURE — 97530 THERAPEUTIC ACTIVITIES: CPT

## 2024-05-16 PROCEDURE — 6370000000 HC RX 637 (ALT 250 FOR IP): Performed by: FAMILY MEDICINE

## 2024-05-16 PROCEDURE — 80048 BASIC METABOLIC PNL TOTAL CA: CPT

## 2024-05-16 PROCEDURE — 70551 MRI BRAIN STEM W/O DYE: CPT

## 2024-05-16 PROCEDURE — 83735 ASSAY OF MAGNESIUM: CPT

## 2024-05-16 PROCEDURE — 85025 COMPLETE CBC W/AUTO DIFF WBC: CPT

## 2024-05-16 PROCEDURE — 82947 ASSAY GLUCOSE BLOOD QUANT: CPT

## 2024-05-16 PROCEDURE — 2580000003 HC RX 258: Performed by: NURSE PRACTITIONER

## 2024-05-16 PROCEDURE — 84443 ASSAY THYROID STIM HORMONE: CPT

## 2024-05-16 PROCEDURE — 94761 N-INVAS EAR/PLS OXIMETRY MLT: CPT

## 2024-05-16 PROCEDURE — 6370000000 HC RX 637 (ALT 250 FOR IP): Performed by: INTERNAL MEDICINE

## 2024-05-16 PROCEDURE — 36415 COLL VENOUS BLD VENIPUNCTURE: CPT

## 2024-05-16 PROCEDURE — 80061 LIPID PANEL: CPT

## 2024-05-16 PROCEDURE — 85520 HEPARIN ASSAY: CPT

## 2024-05-16 RX ORDER — LISINOPRIL 2.5 MG/1
2.5 TABLET ORAL DAILY
Status: DISCONTINUED | OUTPATIENT
Start: 2024-05-16 | End: 2024-05-17 | Stop reason: HOSPADM

## 2024-05-16 RX ORDER — SODIUM CHLORIDE 9 MG/ML
INJECTION, SOLUTION INTRAVENOUS CONTINUOUS
Status: DISCONTINUED | OUTPATIENT
Start: 2024-05-16 | End: 2024-05-17 | Stop reason: HOSPADM

## 2024-05-16 RX ORDER — AMIODARONE HYDROCHLORIDE 200 MG/1
200 TABLET ORAL DAILY
Status: DISCONTINUED | OUTPATIENT
Start: 2024-05-16 | End: 2024-05-16

## 2024-05-16 RX ORDER — FUROSEMIDE 40 MG/1
40 TABLET ORAL DAILY
Status: DISCONTINUED | OUTPATIENT
Start: 2024-05-16 | End: 2024-05-17

## 2024-05-16 RX ORDER — TRAMADOL HYDROCHLORIDE 50 MG/1
50 TABLET ORAL EVERY 12 HOURS PRN
Status: DISCONTINUED | OUTPATIENT
Start: 2024-05-16 | End: 2024-05-17 | Stop reason: HOSPADM

## 2024-05-16 RX ORDER — ASPIRIN 81 MG/1
81 TABLET ORAL 2 TIMES DAILY
Status: DISCONTINUED | OUTPATIENT
Start: 2024-05-16 | End: 2024-05-17 | Stop reason: HOSPADM

## 2024-05-16 RX ORDER — ATORVASTATIN CALCIUM 40 MG/1
40 TABLET, FILM COATED ORAL NIGHTLY
Status: DISCONTINUED | OUTPATIENT
Start: 2024-05-16 | End: 2024-05-17 | Stop reason: HOSPADM

## 2024-05-16 RX ORDER — AMIODARONE HYDROCHLORIDE 200 MG/1
200 TABLET ORAL 2 TIMES DAILY
Status: DISCONTINUED | OUTPATIENT
Start: 2024-05-16 | End: 2024-05-17 | Stop reason: HOSPADM

## 2024-05-16 RX ADMIN — ASPIRIN 81 MG: 81 TABLET, COATED ORAL at 20:59

## 2024-05-16 RX ADMIN — METOPROLOL TARTRATE 25 MG: 25 TABLET, FILM COATED ORAL at 09:53

## 2024-05-16 RX ADMIN — FAMOTIDINE 20 MG: 20 TABLET, FILM COATED ORAL at 09:53

## 2024-05-16 RX ADMIN — APIXABAN 5 MG: 5 TABLET, FILM COATED ORAL at 09:53

## 2024-05-16 RX ADMIN — SODIUM CHLORIDE, PRESERVATIVE FREE 10 ML: 5 INJECTION INTRAVENOUS at 20:59

## 2024-05-16 RX ADMIN — APIXABAN 5 MG: 5 TABLET, FILM COATED ORAL at 20:59

## 2024-05-16 RX ADMIN — AMIODARONE HYDROCHLORIDE 200 MG: 200 TABLET ORAL at 09:53

## 2024-05-16 RX ADMIN — FAMOTIDINE 20 MG: 20 TABLET, FILM COATED ORAL at 20:59

## 2024-05-16 RX ADMIN — SODIUM CHLORIDE, PRESERVATIVE FREE 10 ML: 5 INJECTION INTRAVENOUS at 09:54

## 2024-05-16 RX ADMIN — METOPROLOL TARTRATE 25 MG: 25 TABLET, FILM COATED ORAL at 20:59

## 2024-05-16 RX ADMIN — AMIODARONE HYDROCHLORIDE 200 MG: 200 TABLET ORAL at 20:59

## 2024-05-16 RX ADMIN — ATORVASTATIN CALCIUM 40 MG: 40 TABLET, FILM COATED ORAL at 20:59

## 2024-05-16 RX ADMIN — SODIUM CHLORIDE: 9 INJECTION, SOLUTION INTRAVENOUS at 09:59

## 2024-05-16 NOTE — CONSULTS
[facility] Neurology   IN-PATIENT SERVICE      NEUROLOGY CONSULT  NOTE            Date:   5/16/2024  Patient name:  Manjinder Hussein  Date of admission:  5/15/2024  YOB: 1945      Chief Complaint:     Chief Complaint   Patient presents with    Altered Mental Status     Px arrives complaining of feeling \"foggy\" with thoughts. Px states that earlier around 0900 of he experienced stroke like symptoms that passed a few hours later. EMS states on arrival px was found to be in afib RVR.        Reason for Consult:      Aphasia    History of Present Illness:     The patient is a 79 y.o. male who presents with Altered Mental Status (Px arrives complaining of feeling \"foggy\" with thoughts. Px states that earlier around 0900 of he experienced stroke like symptoms that passed a few hours later. EMS states on arrival px was found to be in afib RVR. )  . The patient was seen and examined and the chart was reviewed.  Patient presents to the hospital on 5/15 with complaint of difficulty with his speech.  Supposedly woke up like this, noticed that he was having trouble getting certain words out.  Felt as though he knew what he wanted to say but the words were coming out incorrectly.  Also had some difficulty with his understanding.  When he came into the ED he was noted to be in A-fib with RVR.  Patient has history of known atrial fibrillation previously had cardioversion done back in March 2024.  He was supposed to be on Eliquis 5 mg twice daily which he does not take.  Supposedly he does not take any of his home medications.  He also has significant history of CAD with CABG back in 2012 as well as DM, HTN, HLD.  CT head was negative for acute abnormalities in the ED.  CTA head and neck with no significant stenosis or occlusion.  Patient started on heparin drip without bolus and admitted for further workup of his aphasia.    Today he underwent MRI of the brain which indeed reveals acute infarct in the left posterior MCA  maintained without evidence of an acute infarct. There is  prominence of the ventricles and sulci due to global parenchymal volume loss.  There are nonspecific areas of hypoattenuation within the periventricular and  subcortical white matter, which likely represent chronic microvascular  ischemic change.    ORBITS: The visualized portion of the orbits demonstrate no acute  abnormality.  Postsurgical changes are seen in the left globe.    SINUSES: The visualized paranasal sinuses and mastoid air cells demonstrate  no acute abnormality.    SOFT TISSUES/SKULL: No acute abnormality of the visualized skull or soft  tissues.    Impression  No acute intracranial abnormality.      CTA head and neck: No significant stenosis or occlusion.    MRI brain: Acute area of diffusion restriction in the left insular and temporal region consistent with acute ischemia.        ARNAUD (March, 2024): Moderately reduced LV systolic function.  Moderate global hypokinesis.  LA normal size.     I personally reviewed all of the above medications, clinical laboratory, imaging and other diagnostic tests.         Impression:      Acute left MCA stroke with residual expressive > receptive aphasia; etiology cardioembolic with atrial fibrillation, noncompliant on Eliquis at home  History of atrial fibrillation status post cardioversion in March  History of CAD, CABG, HTN, DM, HLD    Plan:     Continue with Eliquis  LDL cholesterol is 116; Lipitor 40 mg  Management of atrial fibrillation as per cardiology  Patient will need speech therapy moving forward  From neurostandpoint do not need to repeat echo at this time as he had ARNAUD done in March and we know etiology of stroke.  PT OT  Stressed importance of compliant with his medications.  Patient and wife at bedside.  All questions answered to best of their satisfaction at this time.       Thank you for this very interesting consultation.      Electronically signed by Jaydon Gonzalez DO on 5/16/2024 at 5:08

## 2024-05-16 NOTE — CONSULTS
Reason for Consult: Paroxysmal atrial fibrillation  Requesting Physician: Brooke Suarez MD    CHIEF COMPLAINT: Paroxysmal atrial fibrillation      HISTORY OF PRESENT ILLNESS:    This is a 79-year-old gentleman with history of diabetes mellitus, essential hypertension, dyslipidemia, atherosclerotic CAD status post CABG in 2012, paroxysmal atrial fibrillation and obesity with possible obstructive sleep apnea, osteoarthritis, melanoma and GERD presented to emergency room with complaints of generalized weakness, confusion and slurred speech.  Stroke alert was called however CT scan did not show any acute brain pathology.    He was noted to be in atrial fibrillation with rapid ventricular response and diltiazem was used for heart rate control as well as heparin drip was started for anticoagulation.  Patient underwent successful electrical cardioversion on March 15, 2024 and has been on amiodarone and metoprolol for rate and rhythm control as well as apixaban for anticoagulation.      Past Medical History:    Past Medical History:   Diagnosis Date    Arthritis     Atrial fibrillation with RVR (HCC)     CAD (coronary artery disease)     atrial fib, follows with Dr. 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 rendon    CATARACT REMOVAL WITH IMPLANT Bilateral     CHOLECYSTECTOMY      Gangrene    COLON SURGERY  2014    with Dr. Hatch, 1.5 ft of transverse colon removed, had  Andre, Atria, APRN - CNP        acetaminophen (TYLENOL) tablet 650 mg  650 mg Oral Q6H PRN Andre, Atria, APRN - CNP        Or    acetaminophen (TYLENOL) suppository 650 mg  650 mg Rectal Q6H PRN Andre, Atria, APRN - CNP        famotidine (PEPCID) tablet 20 mg  20 mg Oral BID Andre, Atria, APRN - CNP   20 mg at 05/16/24 0953    insulin lispro (HUMALOG,ADMELOG) injection vial 0-16 Units  0-16 Units SubCUTAneous TID WC Andre, Atria, APRN - CNP        insulin lispro (HUMALOG,ADMELOG) injection vial 0-4 Units  0-4 Units SubCUTAneous Nightly Andre, Atria, APRN - CNP         Allergies:  Patient has no known allergies.    Social History:    Social History     Socioeconomic History    Marital status: Single     Spouse name: Not on file    Number of children: Not on file    Years of education: Not on file    Highest education level: Not on file   Occupational History    Not on file   Tobacco Use    Smoking status: Never    Smokeless tobacco: Never   Vaping Use    Vaping Use: Never used   Substance and Sexual Activity    Alcohol use: No    Drug use: No    Sexual activity: Not on file   Other Topics Concern    Not on file   Social History Narrative    Not on file     Social Determinants of Health     Financial Resource Strain: Not on file   Food Insecurity: No Food Insecurity (5/15/2024)    Hunger Vital Sign     Worried About Running Out of Food in the Last Year: Never true     Ran Out of Food in the Last Year: Never true   Transportation Needs: No Transportation Needs (5/15/2024)    PRAPARE - Transportation     Lack of Transportation (Medical): No     Lack of Transportation (Non-Medical): No   Physical Activity: Not on file   Stress: Not on file   Social Connections: Not on file   Intimate Partner Violence: Not on file   Housing Stability: Low Risk  (5/15/2024)    Housing Stability Vital Sign     Unable to Pay for Housing in the Last Year: No     Number of Places Lived in the Last Year: 1     Unstable Housing in the Last Year:

## 2024-05-16 NOTE — PROGRESS NOTES
SPIRITUAL CARE DEPARTMENT [facility]  PROGRESS NOTE    Room # 2042/2042-01   Name: Manjinder Hussein              Reason for visit: Routine    I visited the patient.    Admit Date & Time: 5/15/2024  6:24 PM    Assessment:  Manjinder Hussein is a 79 y.o. male.  Upon entering the room patient states well, states no major needs or prayers.  Patient kindly declines Spiritual Care visit.  leaves prayer card for patient for possible follow up as needed.      Intervention:   provided a ministry presence.    Outcome:  Patient grateful for the visit.    Plan:  Chaplains will remain available to offer spiritual and emotional support as needed.    Electronically signed by Chaplain Harjinder, on 5/16/2024 at 6:05 PM.  Spiritual Care Department  [facility]      05/16/24 1803   Encounter Summary   Encounter Overview/Reason Initial Encounter   Service Provided For Patient   Referral/Consult From Rounding   Support System Family members   Last Encounter  05/16/24   Complexity of Encounter Low   Begin Time 0510   End Time  0512   Total Time Calculated 2 min   Assessment/Intervention/Outcome   Assessment Unable to assess   Intervention Active listening;Sustaining Presence/Ministry of presence   Outcome Refused/Declined

## 2024-05-16 NOTE — PROGRESS NOTES
Occupational Therapy  Facility/Department: [facility]  Occupational Therapy Initial Assessment    Name: Manjinder Hussein  : 1945  MRN: [id number]  Date of Service: 2024    Discharge Recommendations:  Continue to assess    OT Equipment Recommendations  Equipment Needed:  (Continue to assess)     CHARLEEN Escobar reports patient is medically stable for therapy treatment this date.    Chart reviewed prior to treatment and patient is agreeable for therapy.  All lines intact and patient positioned comfortably at end of treatment.  All patient needs addressed prior to ending therapy session.       Per HPI:  79-year-old male presenting to the ER with symptoms of confusion as well as weakness. The patient's symptoms started at 9 AM this morning. The patient does have an underlying history of atrial fibrillation and is unsure if he is utilizing Eliquis at home. It is marked in the chart as at home medication.     Patient Diagnosis(es): The primary encounter diagnosis was Altered mental status, unspecified altered mental status type. Diagnoses of Delayed speech and Atrial fibrillation with RVR (HCC) were also pertinent to this visit.  Past Medical History:  has a past medical history of Arthritis, Atrial fibrillation with RVR (HCC), CAD (coronary artery disease), Cancer (HCC), CHF (congestive heart failure) (HCC), Diabetes mellitus (HCC), Full dentures, GERD (gastroesophageal reflux disease), History of kidney stones, Hyperlipidemia, Hypertension, Other disorders of kidney and ureter in diseases classified elsewhere, Pneumonia, and Snores.  Past Surgical History:  has a past surgical history that includes Cholecystectomy; Colonoscopy; Abdomen surgery; Colon surgery (); Knee arthroscopy (Bilateral); Cataract removal with implant (Bilateral); Skin cancer excision; vitrectomy (Left, 2021); Coronary artery bypass graft (2012); and Reverse total shoulder arthroplasty (Left, 2018).    Assessment   Performance deficits  guard assistance  Functional Mobility Skilled Clinical Factors: Patient completed functional mobility from chair<>door in room with no device and CGA. Patient presents with lateral sway at times while up in function; however did not lose balance or need to reach out for support. Patient denies dizziness throughout mobility and reports feeling like his \"normal\", spouse states patient's balance is close to baseline.  Additional Comments: Patient's ADL performance limited at this time d/t cognition, balance, and fair tolerance for functional activity.  Skin Care: Soap and water    Bed mobility  Bed Mobility Comments: Not assessed. Patient up in chair upon entry and at session end.    Transfers  Sit to stand: Contact guard assistance  Stand to sit: Contact guard assistance  Transfer Comments: Patient able to complete transfers with no device and CGA. Patient currently with difficulty following directions; therapist asked patient to scoot bottom back in chair and instead impulsively stood up. Patient had been up to chair without alarm underneath, RN stating patient is okay without chair alarm while family is present. Chair alarm will be placed once family leaves for the day.    Vision & Hearing  Vision  Vision: Impaired  Vision Exceptions: Wears glasses for reading  Hearing  Hearing: Within functional limits    Cognition  Overall Cognitive Status: Exceptions  Arousal/Alertness: Appropriate responses to stimuli;Delayed responses to stimuli  Following Commands: Follows multistep commands with increased time;Follows one step commands with repetition;Inconsistently follows commands  Attention Span: Appears intact  Memory:  (Difficult to assess d/t communication deficits)  Safety Judgement: Decreased awareness of need for safety;Decreased awareness of need for assistance  Problem Solving: Assistance required to implement solutions;Assistance required to identify errors made;Assistance required to correct errors

## 2024-05-16 NOTE — CARE COORDINATION
Case Management Assessment  Initial Evaluation    Date/Time of Evaluation: 5/16/2024 2:57 PM  Assessment Completed by: YURI KRUEGER RN    If patient is discharged prior to next notation, then this note serves as note for discharge by case management.    Patient Name: Manjinder Hussein                   YOB: 1945  Diagnosis: Aphasia [R47.01]  Atrial fibrillation with RVR (HCC) [I48.91]  Delayed speech [F80.9]  Altered mental status, unspecified altered mental status type [R41.82]                   Date / Time: 5/15/2024  6:24 PM    Patient Admission Status: Inpatient   Readmission Risk (Low < 19, Mod (19-27), High > 27): Readmission Risk Score: 7.9    Current PCP: Lindsay Miguel MD  PCP verified by CM? Yes    Chart Reviewed: Yes      History Provided by: Patient, Spouse  Patient Orientation: Alert and Oriented, Person, Place, Situation, Self    Patient Cognition: Alert    Hospitalization in the last 30 days (Readmission):  No    If yes, Readmission Assessment in  Navigator will be completed.    Advance Directives:      Code Status: Full Code   Patient's Primary Decision Maker is: Legal Next of Kin      Discharge Planning:    Patient lives with: Spouse/Significant Other Type of Home: House  Primary Care Giver: Self  Patient Support Systems include: Spouse/Significant Other   Current Financial resources: None  Current community resources: None  Current services prior to admission: None            Current DME:              Type of Home Care services:  OT, PT, RT, Skilled Therapy    ADLS  Prior functional level: Independent in ADLs/IADLs  Current functional level: Assistance with the following:, Cooking, Housework, Shopping, Mobility    PT AM-PAC:   /24  OT AM-PAC: 19 /24    Family can provide assistance at DC: Yes  Would you like Case Management to discuss the discharge plan with any other family members/significant others, and if so, who? Yes (spouse)  Plans to Return to Present Housing: Unknown at

## 2024-05-16 NOTE — H&P
History & Physical  [facility]    Adult Hospitalist      Name: Manjinder Hussein  MRN: [id number]     Acct: [id number]  Room: 2042/2042-01    Admit Date: 5/15/2024  6:24 PM  PCP: Lindsay Miguel MD    Primary Problem  Principal Problem:    Aphasia  Resolved Problems:    * No resolved hospital problems. *        Assesment/ plan:     Acute encephalopathy/expressive aphasia/delayed speech:  Patient has strokelike symptoms  Continue aspirin  Lipid profile  TSH  Hemoglobin A1c  Patient is status post CT brain and CTA head and neck in the emergency room  MRI brain   Neurology consult    Atrial fibrillation with RVR:  Continue amiodarone  Continue metoprolol  Continue Eliquis    Mixed hyperlipidemia:  Continue atorvastatin    Hypertensive heart disease:  Continue lisinopril    Diabetes mellitus type 2:  Suicide scale, close medical blood sugar      Continue to monitor/telemetry/CBC with differential daily/BMP daily  DVT and GI prophylaxis.  Continue medications as below      Scheduled Meds:   metoprolol tartrate  25 mg Oral BID    apixaban  5 mg Oral BID    atorvastatin  40 mg Oral Nightly    amiodarone  200 mg Oral BID    aspirin  81 mg Oral BID RT    furosemide  40 mg Oral Daily    lisinopril  2.5 mg Oral Daily    sodium chloride flush  10 mL IntraVENous 2 times per day    famotidine  20 mg Oral BID    insulin lispro  0-16 Units SubCUTAneous TID WC    insulin lispro  0-4 Units SubCUTAneous Nightly     Continuous Infusions:   sodium chloride 75 mL/hr at 05/16/24 0959    dextrose      sodium chloride       PRN Meds:  traMADol, 50 mg, Q12H PRN  sodium chloride flush, 10 mL, PRN  glucose, 4 tablet, PRN  dextrose bolus, 125 mL, PRN   Or  dextrose bolus, 250 mL, PRN  glucagon (rDNA), 1 mg, PRN  dextrose, , Continuous PRN  sodium chloride flush, 10 mL, PRN  sodium chloride, , PRN  potassium chloride, 40 mEq, PRN   Or  potassium alternative oral replacement, 40 mEq, PRN   Or  potassium chloride, 10 mEq, PRN  magnesium  pain with palpation  Skin - no gross lesions, rashes, or induration noted        Data:     Labs:    Hematology:  Recent Labs     05/15/24  1825 05/16/24  0227   WBC 8.2 7.9   RBC 5.08 4.63   HGB 14.4 12.9*   HCT 45.9 42.3   MCV 90.4 91.4   MCH 28.3 27.9   MCHC 31.4 30.5   RDW 14.6* 14.7*    154   MPV 8.9 8.9   INR 1.1  --      Chemistry:  Recent Labs     05/15/24  1825 05/16/24  0227    139   K 4.3 4.0    105   CO2 25 21   GLUCOSE 149* 115*   BUN 10 11   CREATININE 0.9 0.8   MG  --  2.0   ANIONGAP 12 13   LABGLOM 87 >90   CALCIUM 8.9 8.5*   TROPHS 21  --      Recent Labs     05/15/24  1825 05/15/24  1835 05/15/24  2316 05/16/24  0227 05/16/24  0843 05/16/24  1313 05/16/24  1652   LABA1C  --   --   --  6.6*  --   --   --    TSH  --   --   --  2.27  --   --   --    AST 15  --   --   --   --   --   --    ALT 9  --   --   --   --   --   --    ALKPHOS 77  --   --   --   --   --   --    BILITOT 0.8  --   --   --   --   --   --    CHOL  --   --   --  180  --   --   --    HDL  --   --   --  43  --   --   --    CHOLHDLRATIO  --   --   --  4.0  --   --   --    TRIG  --   --   --  104  --   --   --    VLDL  --   --   --  21  --   --   --    POCGLU  --  154* 116*  --  116* 140* 138*       Lab Results   Component Value Date    INR 1.1 05/15/2024    PROTIME 14.5 (H) 05/15/2024       Lab Results   Component Value Date/Time    SPECIAL NOT REPORTED 02/11/2015 11:26 AM     Lab Results   Component Value Date/Time    CULTURE  02/11/2015 11:26 AM     [facility] [address] [place] [id number] [phone]    CULTURE (A) 02/11/2015 11:26 AM     STAPHYLOCOCCUS AUREUS LIGHT GROWTH This isolate is methicillin susceptible.    CULTURE STREPTOCOCCI, BETA HEMOLYTIC GROUP B LIGHT GROWTH (A) 02/11/2015 11:26 AM    CULTURE NORMAL JOE 02/11/2015 11:26 AM    CULTURE MIXED ANAEROBIC JOE (A) 02/11/2015 11:26 AM       No results found for: \"POCPH\", \"PHART\", \"PH\", \"POCPCO2\", \"LHW0PFP\", \"PCO2\", \"POCPO2\", \"PO2ART\", \"PO2\",

## 2024-05-16 NOTE — PROGRESS NOTES
ADMISSION NOTE         Patient admitted to room: 2042     Time of admit: 2240     Admit from: ED     Reason for admission: Aphasia, a-fib     Where patient has been residing for the last 24 hrs: Home     Has the patient been admitted to any facility in the last 4 weeks, which one:  no     Family at bedside: No     Patient is currently: resting in bed comfortably, vitals obtained, telemetry placed on pt. No distress noted. Patient has been oriented to room, educated on how to use call light, and to call for assistance prior to getting up. Bed in lowest and locked position. 2 siderails up for safety. Call light within reach.

## 2024-05-16 NOTE — PROGRESS NOTES
4 Eyes Skin Assessment     NAME:  Manjinder Hussein  YOB: 1945  MEDICAL RECORD NUMBER:  [id number]    The patient is being assessed for  Admission    I agree that at least one RN has performed a thorough Head to Toe Skin Assessment on the patient. ALL assessment sites listed below have been assessed.      Areas assessed by both nurses:    Head, Face, Ears, Shoulders, Back, Chest, Arms, Elbows, Hands, Sacrum. Buttock, Coccyx, Ischium, and Legs. Feet and Heels        Does the Patient have a Wound? No noted wound(s)       Tomi Prevention initiated by RN: Yes  Wound Care Orders initiated by RN: No    Pressure Injury (Stage 3,4, Unstageable, DTI, NWPT, and Complex wounds) if present, place Wound referral order by RN under : No    New Ostomies, if present place, Ostomy referral order under : No     Nurse 1 eSignature: Electronically signed by Helene Castro RN on 5/16/24 at 1:15 AM EDT    **SHARE this note so that the co-signing nurse can place an eSignature**    Nurse 2 eSignature: Electronically signed by Michelle Smith RN on 5/16/24 at 1:17 AM EDT

## 2024-05-16 NOTE — PROGRESS NOTES
Transitions of Care Pharmacy Service   Medication Review    The patient's list of current home medications has been reviewed.     Source(s) of information: [facility]; [facility]; [facility]; Care Everywhere    Based on information provided by the above source(s), I have updated the patient's home med list as described below.     Please review the ACTION REQUESTED section of this note below for any discrepancies on current hospital orders.      I changed or updated the following medications on the patient's home medication list:  Removed Lasix      Added None     Adjusted   None   Other Notes Unable to interview patient d/t AMS.  Unable to verify if meds are current.  [facility] has not dispensed any meds for a few years.  Amiodarone last filled Feb 2023  Apixaban: med list from  has start date of 3/6/24, but unable to find dispense record  3/6/24  Cardiology note:          Hold lisinopril          Continue ASA 81mg QD          Continue Metoprolol & consider furosemide as needed          Continue atorvastatin            PROVIDER ACTION REQUESTED  Medications that need to be addressed by a physician/nurse practitioner:    Medication Action Requested        Med list has been updated for provider review based on multiple sources.  Please refer to attached notes as dispense dates are not up to date and Med Rec is unable to confirm whether regimen is current.  Unable to interview patient d/t AMS.  Unable to verify if meds are current.  [facility] has not dispensed any meds for a few years.  Amiodarone last filled Feb 2023  Apixaban: med list from  has start date of 3/6/24, but unable to find dispense record  3/6/24  Cardiology note:          Hold lisinopril          Continue ASA 81mg QD          Continue Metoprolol & consider furosemide as needed          Continue atorvastatin            Please feel free to call me with any questions about this encounter. Thank you.    Cecelia Bhaena [facility]   Transitions  [facility] Pharmacy Service  Phone:  [phone]  Fax: [phone]      Electronically signed by Cecelia Bahena RPH on 5/16/2024 at 7:09 PM           Medications Prior to Admission:   apixaban (ELIQUIS) 5 MG TABS tablet, Take 1 tablet by mouth 2 times daily Unable to find record where med was filled. Med list from [facility] has a start date of 3/6/24, but unable to find record where med was filled.  Patient's pharmacy has no record of med.  amiodarone (CORDARONE) 200 MG tablet, Take 1 tablet by mouth 2 times daily Per med list from PCP from August 2023, patient takes Amiodarone 200mg BID. Confirmed with pharmacy that med was last filled February 2023 for 90 days supply.  traMADol (ULTRAM) 50 MG tablet, Take 1 tablet by mouth in the morning and at bedtime.  metoprolol tartrate (LOPRESSOR) 25 MG tablet, Take 0.5 tablets by mouth 2 times daily (Patient taking differently: Take 1 tablet by mouth 2 times daily Indications: take 25mg twice a day Last filled 3/13/24 for 3 months supply; 1/2 tab = 12.5mg po BID)  metFORMIN (GLUCOPHAGE) 500 MG tablet, Take 1 tablet by mouth 2 times daily (Patient taking differently: Take 1 tablet by mouth 2 times daily Last filled Oct 2023 for 3 months supply)  lisinopril (PRINIVIL;ZESTRIL) 2.5 MG tablet, Take 1 tablet by mouth daily (Patient taking differently: Take 1 tablet by mouth daily Last filled Sept 2023 for 3 months supply.)  atorvastatin (LIPITOR) 80 MG tablet, Take 1 tablet by mouth nightly (Patient not taking: Reported on 3/15/2024)  aspirin 81 MG tablet, Take 1 tablet by mouth daily Note from [facility] to continue Aspirin 81mg daily (3/6/24)

## 2024-05-16 NOTE — VIRTUAL HEALTH
[facility] Stroke and Telestroke Consult for  [facility] Stroke Alert through [facility] @ 0638 pm  5/16/2024 12:12 AM    Pt Name: Manjinder Hussein  MRN: [id number]  YOB: 1945  Date of evaluation: 5/16/2024  Primary Care Physician: Lindsay Miguel MD  Reason for Evaluation: Stroke evaluation with Phone Consult, Discussion and Review of imaging    Manjinder Hussein is a 79 y.o. male with past medical history of atrial fibrillation on Eliquis presents to the ER with confusion.  Per ER physician patient has no aphasia but has delayed speech.  No focal weakness    LKW: 0900  NIH:  0    Allergies  has No Known Allergies.  Medications  Prior to Admission medications    Medication Sig Start Date End Date Taking? Authorizing Provider   apixaban (ELIQUIS) 5 MG TABS tablet Take 1 tablet by mouth 2 times daily    Kali Glez MD   amiodarone (CORDARONE) 200 MG tablet Take 1 tablet by mouth 2 times daily    Kali Glez MD   traMADol (ULTRAM) 50 MG tablet Take 1 tablet by mouth every 12 hours as needed for Pain.    ProviderKali MD   metoprolol tartrate (LOPRESSOR) 25 MG tablet Take 0.5 tablets by mouth 2 times daily  Patient taking differently: Take 1 tablet by mouth 2 times daily Indications: take 25mg twice a day 1/31/18   Magdaleno Nichols MD   metFORMIN (GLUCOPHAGE) 500 MG tablet Take 1 tablet by mouth 2 times daily 1/31/18   Magdaleno Nichols MD   lisinopril (PRINIVIL;ZESTRIL) 2.5 MG tablet Take 1 tablet by mouth daily 1/31/18   Magdaleno Nichols MD   furosemide (LASIX) 40 MG tablet Take 1 tablet by mouth daily 1/31/18   Magdalneo Nichols MD   atorvastatin (LIPITOR) 80 MG tablet Take 1 tablet by mouth nightly  Patient not taking: Reported on 3/15/2024 1/31/18   Magdaleno Nichols MD   aspirin 81 MG tablet Take 1 tablet by mouth in the morning and 1 tablet in the evening.    ProviderKali MD    Scheduled Meds:   sodium chloride flush  10 mL IntraVENous 2 times per day     with ED Physician      At least 10 min of Telemedicine and time in conversation directly with ED staff and physician for the patient who is in imminent and life threatening deterioration without further treatment and evaluation.      Telestroke: This is a Phone Consult, I have not seen the patient face to face, there is no telemedicine service available at the [facility] EMERGENCY DEPT      Mitchel Feliciano MD, MD   Stroke, Neurocritical Care And/or Neurointervention  [facility] Stroke [facility] Stroke [facility] [place]  Electronically signed 5/16/2024 at 12:12 AM    The patient was located at Facility (Appt Department): [facility]  [address] [address]  Loc: [phone]  The provider was located at Home (City/State): [facility]  Confirm you are appropriately licensed, registered, or certified to deliver care in the state where the patient is located as indicated above. If you are not or unsure, please re-schedule the visit: Yes, I confirm.   Consults     Total time spent on this encounter:  10 minutes    --Mitchel Feliciano MD on 5/16/2024 at 12:11 AM    An electronic signature was used to authenticate this note.

## 2024-05-16 NOTE — PROGRESS NOTES
End Of Shift Note  [facility]  Summary of shift: the patient had an uneventful night. He denied any c/o pain or discomfort. At the time of this note the patient was in bed with call light within reach and all needs have been met.     Vitals:    Vitals:    05/16/24 0000 05/16/24 0100 05/16/24 0400 05/16/24 0433   BP: 115/86 117/73     Pulse: 75 98  87   Resp: 20  18    Temp: 97.9 °F (36.6 °C)  98.1 °F (36.7 °C)    TempSrc: Temporal  Temporal    SpO2: 92% 93%  92%   Weight:   108.4 kg (238 lb 14.4 oz)    Height:            I&O:   Intake/Output Summary (Last 24 hours) at 5/16/2024 0626  Last data filed at 5/16/2024 0625  Gross per 24 hour   Intake 457.89 ml   Output 450 ml   Net 7.89 ml       Resp Status: room air    Ventilator Settings:     / / /     Critical Care IV infusions:   dilTIAZem 125 mg in sodium chloride 0.9 % 125 mL infusion      heparin (PORCINE) Infusion 13 Units/kg/hr (05/16/24 0625)    dextrose      sodium chloride          LDA:   Peripheral IV 05/15/24 Left Antecubital (Active)   Number of days: 0

## 2024-05-16 NOTE — ED NOTES
ED TO INPATIENT SBAR HANDOFF    Patient Name: Manjinder Hussein   :  1945  79 y.o.   MRN:  [id number]  Preferred Name  Manjinder  ED Room #:  STA19/19  Family/Caregiver Present yes   Restraints no   Sitter no   Sepsis Risk Score Sepsis Risk Score: 3.89    Situation  Code Status: No Order No additional code details.    Allergies: Patient has no known allergies.  Weight: Patient Vitals for the past 96 hrs (Last 3 readings):   Weight   05/15/24 1824 104.3 kg (230 lb)     Arrived from: home  Chief Complaint:   Chief Complaint   Patient presents with    Altered Mental Status     Px arrives complaining of feeling \"foggy\" with thoughts. Px states that earlier around 0900 of he experienced stroke like symptoms that passed a few hours later. EMS states on arrival px was found to be in afib RVR.      Hospital Problem/Diagnosis:  Principal Problem:    Aphasia  Resolved Problems:    * No resolved hospital problems. *    Imaging:   XR CHEST PORTABLE   Final Result   Cardiomegaly, otherwise, no acute cardiopulmonary process         CTA HEAD NECK W CONTRAST   Final Result   No evidence of significant arterial stenosis or occlusion in the head or neck.         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      Findings were discussed with IFTIKHAR PAGAN at 6:01 pm on 5/15/2024.         Final   No acute intracranial abnormality.           Abnormal labs:   Abnormal Labs Reviewed   CBC WITH AUTO DIFFERENTIAL - Abnormal; Notable for the following components:       Result Value    RDW 14.6 (*)     All other components within normal limits   COMPREHENSIVE METABOLIC PANEL W/ REFLEX TO MG FOR LOW K - Abnormal; Notable for the following components:    Glucose 149 (*)     All other components within normal limits   PROTIME-INR - Abnormal; Notable for the following components:    Protime 14.5 (*)     All other components within normal limits   POC GLUCOSE

## 2024-05-17 VITALS
RESPIRATION RATE: 17 BRPM | HEART RATE: 73 BPM | OXYGEN SATURATION: 93 % | WEIGHT: 233 LBS | TEMPERATURE: 97.7 F | HEIGHT: 69 IN | SYSTOLIC BLOOD PRESSURE: 110 MMHG | BODY MASS INDEX: 34.51 KG/M2 | DIASTOLIC BLOOD PRESSURE: 72 MMHG

## 2024-05-17 LAB
ERYTHROCYTE [DISTWIDTH] IN BLOOD BY AUTOMATED COUNT: 14.6 % (ref 11.8–14.4)
GLUCOSE BLD-MCNC: 103 MG/DL (ref 75–110)
GLUCOSE BLD-MCNC: 107 MG/DL (ref 75–110)
GLUCOSE BLD-MCNC: 132 MG/DL (ref 75–110)
GLUCOSE BLD-MCNC: 97 MG/DL (ref 75–110)
HCT VFR BLD AUTO: 44.2 % (ref 40.7–50.3)
HGB BLD-MCNC: 13.7 G/DL (ref 13–17)
MCH RBC QN AUTO: 28.2 PG (ref 25.2–33.5)
MCHC RBC AUTO-ENTMCNC: 31 G/DL (ref 28.4–34.8)
MCV RBC AUTO: 91.1 FL (ref 82.6–102.9)
NRBC BLD-RTO: 0 PER 100 WBC
PLATELET # BLD AUTO: 155 K/UL (ref 138–453)
PMV BLD AUTO: 8.9 FL (ref 8.1–13.5)
RBC # BLD AUTO: 4.85 M/UL (ref 4.21–5.77)
WBC OTHER # BLD: 8.7 K/UL (ref 3.5–11.3)

## 2024-05-17 PROCEDURE — 94761 N-INVAS EAR/PLS OXIMETRY MLT: CPT

## 2024-05-17 PROCEDURE — 85027 COMPLETE CBC AUTOMATED: CPT

## 2024-05-17 PROCEDURE — 36415 COLL VENOUS BLD VENIPUNCTURE: CPT

## 2024-05-17 PROCEDURE — 97112 NEUROMUSCULAR REEDUCATION: CPT

## 2024-05-17 PROCEDURE — 97110 THERAPEUTIC EXERCISES: CPT

## 2024-05-17 PROCEDURE — 82947 ASSAY GLUCOSE BLOOD QUANT: CPT

## 2024-05-17 PROCEDURE — 6370000000 HC RX 637 (ALT 250 FOR IP): Performed by: NURSE PRACTITIONER

## 2024-05-17 PROCEDURE — 97530 THERAPEUTIC ACTIVITIES: CPT

## 2024-05-17 PROCEDURE — 97162 PT EVAL MOD COMPLEX 30 MIN: CPT

## 2024-05-17 PROCEDURE — 6370000000 HC RX 637 (ALT 250 FOR IP): Performed by: FAMILY MEDICINE

## 2024-05-17 PROCEDURE — 6370000000 HC RX 637 (ALT 250 FOR IP): Performed by: INTERNAL MEDICINE

## 2024-05-17 PROCEDURE — 99232 SBSQ HOSP IP/OBS MODERATE 35: CPT | Performed by: PSYCHIATRY & NEUROLOGY

## 2024-05-17 RX ORDER — ATORVASTATIN CALCIUM 40 MG/1
40 TABLET, FILM COATED ORAL NIGHTLY
Qty: 30 TABLET | Refills: 3 | Status: SHIPPED | OUTPATIENT
Start: 2024-05-17

## 2024-05-17 RX ORDER — FUROSEMIDE 40 MG/1
40 TABLET ORAL DAILY
Qty: 90 TABLET | Refills: 3 | Status: SHIPPED | OUTPATIENT
Start: 2024-05-17

## 2024-05-17 RX ORDER — FUROSEMIDE 40 MG/1
40 TABLET ORAL DAILY PRN
Status: DISCONTINUED | OUTPATIENT
Start: 2024-05-17 | End: 2024-05-17 | Stop reason: HOSPADM

## 2024-05-17 RX ADMIN — ASPIRIN 81 MG: 81 TABLET, COATED ORAL at 08:42

## 2024-05-17 RX ADMIN — LISINOPRIL 2.5 MG: 2.5 TABLET ORAL at 08:42

## 2024-05-17 RX ADMIN — APIXABAN 5 MG: 5 TABLET, FILM COATED ORAL at 08:42

## 2024-05-17 RX ADMIN — AMIODARONE HYDROCHLORIDE 200 MG: 200 TABLET ORAL at 08:42

## 2024-05-17 RX ADMIN — METOPROLOL TARTRATE 25 MG: 25 TABLET, FILM COATED ORAL at 08:42

## 2024-05-17 RX ADMIN — FAMOTIDINE 20 MG: 20 TABLET, FILM COATED ORAL at 08:42

## 2024-05-17 NOTE — PLAN OF CARE
Problem: Chronic Conditions and Co-morbidities  Goal: Patient's chronic conditions and co-morbidity symptoms are monitored and maintained or improved  Outcome: Progressing  Flowsheets (Taken 5/16/2024 2000)  Care Plan - Patient's Chronic Conditions and Co-Morbidity Symptoms are Monitored and Maintained or Improved: Monitor and assess patient's chronic conditions and comorbid symptoms for stability, deterioration, or improvement     Problem: Discharge Planning  Goal: Discharge to home or other facility with appropriate resources  Outcome: Progressing  Flowsheets (Taken 5/16/2024 2000)  Discharge to home or other facility with appropriate resources:   Identify barriers to discharge with patient and caregiver   Arrange for needed discharge resources and transportation as appropriate   Identify discharge learning needs (meds, wound care, etc)   Refer to discharge planning if patient needs post-hospital services based on physician order or complex needs related to functional status, cognitive ability or social support system     Problem: Safety - Adult  Goal: Free from fall injury  Outcome: Progressing  Flowsheets (Taken 5/17/2024 0434)  Free From Fall Injury: Instruct family/caregiver on patient safety     Problem: Skin/Tissue Integrity  Goal: Absence of new skin breakdown  Description: 1.  Monitor for areas of redness and/or skin breakdown  2.  Assess vascular access sites hourly  3.  Every 4-6 hours minimum:  Change oxygen saturation probe site  4.  Every 4-6 hours:  If on nasal continuous positive airway pressure, respiratory therapy assess nares and determine need for appliance change or resting period.  Outcome: Progressing

## 2024-05-17 NOTE — DISCHARGE SUMMARY
DISCHARGE SUMMARY  [facility]    Adult Hospitalist      Patient ID: Manjinder Hussein  MRN: [id number]     Acct:  [id number]       Patient's PCP: Lindsay Miguel MD    Admit Date: 5/15/2024     Discharge Date:   5/17/2024    Admitting Physician: Brooke Suarez MD    Discharge Physician: CAMI SHERMAN MD     CONSULTANTS: Patient Care Team:  Lindsay Miguel MD as PCP - General (Family Medicine)      HPI:     Manjinder Hussein is a 79 y.o.  male who presents with Altered Mental Status (Px arrives complaining of feeling \"foggy\" with thoughts. Px states that earlier around 0900 of he experienced stroke like symptoms that passed a few hours later. EMS states on arrival px was found to be in afib RVR. )  This 79-year-old gentleman has been admitted by emergency room, patient came to the ER with a complaint of having altered mental status, further patient family stated that patient has been feeling foggy with his thoughts and not able to express himself clearly, patient has past medical history significant for atrial fibrillation, further patient is been taking Eliquis at home for anticoagulation patientFurther evaluation notices patient is having difficulty with expressive aphasia, patient also has some delayed speech, and is confused at times, admitted for further management          Active Discharge Diagnoses with hospital course:    Acute encephalopathy/expressive aphasia/delayed speech:  Patient has strokelike symptoms  Continue aspirin  Lipid profile , HDL 43, patient not compliant with atorvastatin resumed at 40 mg daily, likely should be checked again in 2 to 3 months, LDL goal should be less than 70  TSH 2.27  Hemoglobin A1c 6.6  Patient is status post CT brain and CTA head and neck in the emergency room  MRI brain :  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  No mass effect or midline shift.    Echocardiogram not repeated was done in March 2024, has moderate global hypokinesis and moderately reduced left ventricular systolic function  Recommended to follow-up with primary care regarding this     Neurology consult, case discussed with them, they want patient to have swallow study and discharge afterwards, and they will follow this patient closely as an outpatient     Atrial fibrillation with RVR:  Continue amiodarone  Continue metoprolol  Continue Eliquis, resumed as patient was not taking it, discussed and agreed to take it now     Mixed hyperlipidemia:  Continue atorvastatin     Hypertensive heart disease:  Continue lisinopril     Diabetes mellitus type 2:  Insulin sliding scale, close medical blood sugar  Metformin resumed          At the time of discharge patient was hemodynamically stable and asymptomatic.    The plan was discussed in detail with patient who agreed with the plan and verbalized understanding .    The patient was seen and examined on day of discharge and this discharge summary is in conjunction with any daily progress note from day of discharge.    Hospital Data:    Labs:    Hematology:  Recent Labs     05/15/24  1825 05/16/24  0227 05/17/24  0346   WBC 8.2 7.9 8.7   RBC 5.08 4.63 4.85   HGB 14.4 12.9* 13.7   HCT 45.9 42.3 44.2   MCV 90.4 91.4 91.1   MCH 28.3 27.9 28.2   MCHC 31.4 30.5 31.0   RDW 14.6* 14.7* 14.6*    154 155   MPV 8.9 8.9 8.9   INR 1.1  --   --      Chemistry:  Recent Labs     05/15/24  1825 05/16/24  0227    139   K 4.3 4.0    105   CO2 25 21   GLUCOSE 149* 115*   BUN 10 11   CREATININE 0.9 0.8   MG  --  2.0   ANIONGAP 12 13   LABGLOM 87 >90   CALCIUM 8.9 8.5*   TROPHS 21  --      Recent Labs     05/15/24  1825 05/15/24  1835 05/16/24  0227 05/16/24  0843 05/16/24  1652 05/16/24 1952 05/17/24  0019 05/17/24  0345 05/17/24  0747 05/17/24  1152   LABA1C  --   --  6.6*  --   --   --   --   --   --   --

## 2024-05-17 NOTE — PROGRESS NOTES
Physical Therapy  Facility/Department: [facility]  Physical Therapy Initial Assessment    Name: Manjinder Hussein  : 1945  MRN: [id number]  Date of Service: 2024    Discharge Recommendations:  Patient would benefit from continued therapy after discharge          History of Present Illness:      Manjinder Hussein is a 79 y.o.  male who presents with Altered Mental Status (Px arrives complaining of feeling \"foggy\" with thoughts. Px states that earlier around 0900 of he experienced stroke like symptoms that passed a few hours later. EMS states on arrival px was found to be in afib RVR. )  This 79-year-old gentleman has been admitted by emergency room, patient came to the ER with a complaint of having altered mental status, further patient family stated that patient has been feeling foggy with his thoughts and not able to express himself clearly, patient has past medical history significant for atrial fibrillation, further patient is been taking Eliquis at home for anticoagulation patientFurther evaluation notices patient is having difficulty with expressive aphasia, patient also has some delayed speech, and is confused at times, admitted for further management    Patient Diagnosis(es): The primary encounter diagnosis was Altered mental status, unspecified altered mental status type. Diagnoses of Delayed speech and Atrial fibrillation with RVR (HCC) were also pertinent to this visit.  Past Medical History:  has a past medical history of Arthritis, Atrial fibrillation with RVR (HCC), CAD (coronary artery disease), Cancer (HCC), CHF (congestive heart failure) (HCC), Diabetes mellitus (HCC), Full dentures, GERD (gastroesophageal reflux disease), History of kidney stones, Hyperlipidemia, Hypertension, Other disorders of kidney and ureter in diseases classified elsewhere, Pneumonia, and Snores.  Past Surgical History:  has a past surgical history that includes Cholecystectomy; Colonoscopy; Abdomen surgery; Colon surgery  (2014); Knee arthroscopy (Bilateral); Cataract removal with implant (Bilateral); Skin cancer excision; vitrectomy (Left, 04/13/2021); Coronary artery bypass graft (07/09/2012); and Reverse total shoulder arthroplasty (Left, 2018).    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 Pt with speech and cognition issues and some balance deficits. Pt with supportive family; 3 kids and wife who doesn't drive. Pt currently with significant short term memory deficits as pt unable to repeat back stroke prevention education after receiving it 2-3 times. Family was able to present back and written information given.  Will continue to progress therapy while hospitalized but would suggest additional therapy at time of discharge.    Specific Instructions for Next Treatment: review HEP ; balance ex  Therapy Prognosis: Good  Decision Making: Medium Complexity  Clinical Presentation: evoving  Requires PT Follow-Up: Yes  Activity Tolerance  Activity Tolerance: Patient tolerated evaluation without incident     Plan   Physical Therapy Plan  General Plan: 1 time a day 7 days a week  Specific Instructions for Next Treatment: review HEP ; balance ex  Current Treatment Recommendations: Balance training, Functional mobility training, Home exercise program, Safety education & training, Patient/Caregiver education & training, Therapeutic activities, Stair training  Safety Devices  Type of Devices: All fall risk precautions in place, Call light within reach, Gait belt, Patient at risk for falls, Left in chair, Nurse notified  Restraints  Restraints Initially in Place: No     Restrictions  Restrictions/Precautions  Restrictions/Precautions: General Precautions, Fall Risk, Up as Tolerated  Position Activity

## 2024-05-17 NOTE — PROGRESS NOTES
Section of Cardiology  Progress Note      Date:  5/17/2024  Patient: Manjinder Hussein  Admission:  5/15/2024  6:24 PM  Admit DX: Aphasia [R47.01]  Atrial fibrillation with RVR (HCC) [I48.91]  Delayed speech [F80.9]  Altered mental status, unspecified altered mental status type [R41.82]  Age:  79 y.o., 1945     LOS: 2 days     Reason for evaluation:   Atrial fibrillation      SUBJECTIVE:     The patient was seen and examined. Notes and labs reviewed.    Patient remains in atrial fibrillation however the rate is better controlled therefore we will continue metoprolol and amiodarone for now for rate and rhythm control and apixaban for anticoagulation.  We have advised to get sleep study done and use CPAP if indicated before considering any other attempts of electrical cardioversion.      OBJECTIVE:      EXAM:   Vitals:    VITALS:  /72   Pulse 73   Temp 97.7 °F (36.5 °C) (Temporal)   Resp 17   Ht 1.753 m (5' 9\")   Wt 105.7 kg (233 lb)   SpO2 93%   BMI 34.41 kg/m²    24HR INTAKE/OUTPUT:    Intake/Output Summary (Last 24 hours) at 5/17/2024 1639  Last data filed at 5/17/2024 0535  Gross per 24 hour   Intake 1045.26 ml   Output --   Net 1045.26 ml       Physical exam:  General:  Awake, alert, no apparent discomfort.  HEENT: Atraumatic, normocephalic. No carotid bruit. No JVD.  Chest: Coarse breathing bilateral  Cardiovascular: Irregularly irregular rhythm, S1S2, 2/6 systolic ejection murmur  Abdomen: Obese, non tender to palpation. Active bowel sounds  Musculoskeletal: No cyanosis or clubbing.  Integumentary: Skin turgor normal.  No obvious rash  CNS: Oriented to person, place and time    Current Inpatient Medications:   metoprolol tartrate  25 mg Oral BID    apixaban  5 mg Oral BID    atorvastatin  40 mg Oral Nightly    amiodarone  200 mg Oral BID    aspirin  81 mg Oral BID    lisinopril  2.5 mg Oral Daily    sodium chloride flush  10 mL IntraVENous 2 times per day    famotidine  20 mg Oral BID

## 2024-05-17 NOTE — DISCHARGE INSTR - COC
Continuity of Care Form    Patient Name: Manjinder Hussein   :  1945  MRN:  [id number]    Admit date:  5/15/2024  Discharge date:  2024    Code Status Order: Full Code   Advance Directives:     Admitting Physician:  Brooke Suarez MD  PCP: Lindsay Miguel MD    Discharging Nurse: Slime [facility] Unit/Room#: /-01  Discharging Unit Phone Number: [phone]    Emergency Contact:   Extended Emergency Contact Information  Primary Emergency Contact: Kizzy Hussein   [facility]  Home Phone: [phone]  Relation: Spouse    Past Surgical History:  Past Surgical History:   Procedure Laterality Date    ABDOMEN SURGERY      hernia repair dr. rendon    CATARACT REMOVAL WITH IMPLANT Bilateral     CHOLECYSTECTOMY      Gangrene    COLON SURGERY      with Dr. Hatch, 1.5 ft of transverse colon removed, had tumors, not cancerous per pt    COLONOSCOPY      CORONARY ARTERY BYPASS GRAFT  07/09/2012    x 5, In [place]    KNEE ARTHROSCOPY Bilateral     right x 3, left x 1    REVERSE TOTAL SHOULDER ARTHROPLASTY Left 2018    SKIN CANCER EXCISION      melanoma head    VITRECTOMY Left 2021    VITRECTOMY 23 GAUGE, AIR FLUID EXCHANGE, AIR GAS EXCHANGE WITH 20% SF6, ENDOLASER 200   SPOTS performed by Evan Stout MD at [facility] OR       Immunization History:   Immunization History   Administered Date(s) Administered    COVID-19, MODERNA BLUE border, Primary or Immunocompromised, (age 12y+), IM, 100 mcg/0.5mL 2021    COVID-19, MODERNA Booster BLUE border, (age 18y+), IM, 50mcg/0.25mL 2021    Influenza, High Dose (Fluzone 65 yrs and older) 2017    Pneumococcal Conjugate 7-valent (Prevnar7) 10/17/2014    TDaP, ADACEL (age 10y-64y), BOOSTRIX (age 10y+), IM, 0.5mL 2017       Active Problems:  Patient Active Problem List   Diagnosis Code    Type 2 diabetes mellitus without complication, without long-term current use of insulin (HCC) E11.9    Benign essential HTN I10     Hypercholesteremia E78.00    Chronic combined systolic and diastolic congestive heart failure (HCC) I50.42    Chronic left shoulder pain M25.512, G89.29    Retinal detachment of left eye with retinal break H33.002    Aphasia R47.01       Isolation/Infection:   Isolation            No Isolation          Patient Infection Status       None to display            Nurse Assessment:  Last Vital Signs: BP (!) 142/100   Pulse (!) 105   Temp 97.7 °F (36.5 °C) (Temporal)   Resp 17   Ht 1.753 m (5' 9\")   Wt 105.7 kg (233 lb)   SpO2 95%   BMI 34.41 kg/m²     Last documented pain score (0-10 scale):    Last Weight:   Wt Readings from Last 1 Encounters:   05/17/24 105.7 kg (233 lb)     Mental Status:  alert    IV Access:  - None    Nursing Mobility/ADLs:  Walking   Assisted  Transfer  Assisted  Bathing  Assisted  Dressing  Assisted  Toileting  Assisted  Feeding  Assisted  Med Admin  Assisted  Med Delivery   none    Wound Care Documentation and Therapy:        Elimination:  Continence:   Bowel: yes  Bladder: Yes  Urinary Catheter: None   Colostomy/Ileostomy/Ileal Conduit: No       Date of Last BM: 5/16/2024    Intake/Output Summary (Last 24 hours) at 5/17/2024 1210  Last data filed at 5/17/2024 0535  Gross per 24 hour   Intake 1045.26 ml   Output 100 ml   Net 945.26 ml     I/O last 3 completed shifts:  In: 1503.2 [P.O.:360; I.V.:1143.2]  Out: 760 [Urine:760]    Safety Concerns:     Sundowners [place] and At Risk for Falls    Impairments/Disabilities:      Speech    Nutrition Therapy:  Current Nutrition Therapy:   - Oral Diet:  General    Routes of Feeding: Oral  Liquids: Thin Liquids  Daily Fluid Restriction: no  Last Modified Barium Swallow with Video (Video Swallowing Test): not done    Treatments at the Time of Hospital Discharge:   Respiratory Treatments: ***  Oxygen Therapy:  {Therapy; copd oxygen:63510}  Ventilator:    { CC Vent List:[id number]}    Rehab Therapies: Speech/Language Therapy  Weight Bearing

## 2024-05-17 NOTE — PROGRESS NOTES
[facility]   IN-PATIENT SERVICE      NEUROLOGY PROGRESS  NOTE            Date:   5/17/2024  Patient name:  Manjinder Hussein  Date of admission:  5/15/2024  YOB: 1945      Interval History:     Sitting up in chair.  Family at bedside.  Just got done working with PT and did well.  Speech therapy evaluation currently pending.  SBP 140s.  HR 60-100s.    History of Present Illness:     The patient is a 79 y.o. male who presents with Altered Mental Status (Px arrives complaining of feeling \"foggy\" with thoughts. Px states that earlier around 0900 of he experienced stroke like symptoms that passed a few hours later. EMS states on arrival px was found to be in afib RVR. )  . The patient was seen and examined and the chart was reviewed.  Patient presents to the hospital on 5/15 with complaint of difficulty with his speech.  Supposedly woke up like this, noticed that he was having trouble getting certain words out.  Felt as though he knew what he wanted to say but the words were coming out incorrectly.  Also had some difficulty with his understanding.  When he came into the ED he was noted to be in A-fib with RVR.  Patient has history of known atrial fibrillation previously had cardioversion done back in March 2024.  He was supposed to be on Eliquis 5 mg twice daily which he does not take.  Supposedly he does not take any of his home medications.  He also has significant history of CAD with CABG back in 2012 as well as DM, HTN, HLD.  CT head was negative for acute abnormalities in the ED.  CTA head and neck with no significant stenosis or occlusion.  Patient started on heparin drip without bolus and admitted for further workup of his aphasia.     Today he underwent MRI of the brain which indeed reveals acute infarct in the left posterior MCA distribution in the temporal parietal regions consistent with patient's symptoms.  -130s.currently in A-fib.  Wife is at bedside.    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    Findings were discussed with IFTIKHAR PAGAN at 6:01 pm on 5/15/2024.    Narrative  EXAMINATION:  CT OF THE HEAD WITHOUT CONTRAST  5/15/2024 5:39 pm    TECHNIQUE:  CT of the head was performed without the administration of intravenous  contrast. Automated exposure control, iterative reconstruction, and/or weight  based adjustment of the mA/kV was utilized to reduce the radiation dose to as  low as reasonably achievable.    COMPARISON:  None.    HISTORY:  ORDERING SYSTEM PROVIDED HISTORY: Stroke Symptoms  TECHNOLOGIST PROVIDED HISTORY:    Stroke Symptoms  Decision Support Exception - unselect if not a suspected or confirmed  emergency medical condition->Emergency Medical Condition (MA)  Reason for Exam: Stroke Symptoms    FINDINGS:  BRAIN/VENTRICLES: There is no acute intracranial hemorrhage, mass effect or  midline shift. No abnormal extra-axial fluid collection.  The gray-white  differentiation is maintained without evidence of an acute infarct. There is  prominence of the ventricles and sulci due to global parenchymal volume loss.  There are nonspecific areas of hypoattenuation within the periventricular and  subcortical white matter, which likely represent chronic microvascular  ischemic change.    ORBITS: The visualized portion of the orbits demonstrate no acute  abnormality.  Postsurgical changes are seen in the left globe.    SINUSES: The visualized paranasal sinuses and mastoid air cells demonstrate  no acute abnormality.    SOFT TISSUES/SKULL: No acute abnormality of the visualized skull or soft  tissues.    Impression  No acute intracranial abnormality.        CTA head and neck: No significant stenosis or occlusion.     MRI brain: Acute area of diffusion restriction in the left insular and temporal region consistent with acute ischemia.        ARNAUD

## 2024-05-17 NOTE — PROGRESS NOTES
CLINICAL PHARMACY NOTE: MEDS TO BEDS    Total # of Prescriptions Filled: 4   The following medications were delivered to the patient:  Metoprolol tart 25mg  Furosemide 40mg  Atorvastatin  40mg  Eliquis 5mg    Additional Documentation:

## 2024-05-17 NOTE — CARE COORDINATION
Discharge planning    Plan is home with family and [address] for speech therapy. Plan is home today.

## 2024-05-17 NOTE — PROGRESS NOTES
End Of Shift Note  [facility]  Summary of shift: the patient had an uneventful night. He rested well throughout the night with no c/o pain or discomfort. At the time of this note he was in bed with call light within reach and all needs have been met.     Vitals:    Vitals:    05/16/24 2000 05/16/24 2059 05/17/24 0000 05/17/24 0431   BP:  120/89     Pulse:  77  67   Resp: 18  16    Temp: 97.5 °F (36.4 °C)  97.7 °F (36.5 °C)    TempSrc: Temporal  Temporal    SpO2:    92%   Weight:       Height:            I&O:   Intake/Output Summary (Last 24 hours) at 5/17/2024 0536  Last data filed at 5/17/2024 0535  Gross per 24 hour   Intake 1143.15 ml   Output 310 ml   Net 833.15 ml       Resp Status: Room air    Ventilator Settings:     / / /     Critical Care IV infusions:   sodium chloride 75 mL/hr at 05/17/24 0535    dextrose      sodium chloride          LDA:   Peripheral IV 05/15/24 Left Antecubital (Active)   Number of days: 1

## 2024-05-17 NOTE — PLAN OF CARE
Problem: Chronic Conditions and Co-morbidities  Goal: Patient's chronic conditions and co-morbidity symptoms are monitored and maintained or improved  5/17/2024 1402 by Slime Krishna RN  Outcome: Completed  5/17/2024 0435 by Helene Castro RN  Outcome: Progressing  Flowsheets (Taken 5/16/2024 2000)  Care Plan - Patient's Chronic Conditions and Co-Morbidity Symptoms are Monitored and Maintained or Improved: Monitor and assess patient's chronic conditions and comorbid symptoms for stability, deterioration, or improvement     Problem: Discharge Planning  Goal: Discharge to home or other facility with appropriate resources  5/17/2024 1402 by Slime Krishna RN  Outcome: Completed  5/17/2024 0435 by Helene Castro RN  Outcome: Progressing  Flowsheets (Taken 5/16/2024 2000)  Discharge to home or other facility with appropriate resources:   Identify barriers to discharge with patient and caregiver   Arrange for needed discharge resources and transportation as appropriate   Identify discharge learning needs (meds, wound care, etc)   Refer to discharge planning if patient needs post-hospital services based on physician order or complex needs related to functional status, cognitive ability or social support system     Problem: Safety - Adult  Goal: Free from fall injury  5/17/2024 1402 by Slime Krishna RN  Outcome: Completed  5/17/2024 0435 by Helene Castro RN  Outcome: Progressing  Flowsheets (Taken 5/17/2024 0434)  Free From Fall Injury: Instruct family/caregiver on patient safety     Problem: Skin/Tissue Integrity  Goal: Absence of new skin breakdown  Description: 1.  Monitor for areas of redness and/or skin breakdown  2.  Assess vascular access sites hourly  3.  Every 4-6 hours minimum:  Change oxygen saturation probe site  4.  Every 4-6 hours:  If on nasal continuous positive airway pressure, respiratory therapy assess nares and determine need for appliance change or resting period.  5/17/2024 1402 by

## (undated) DEVICE — RETINA PK

## (undated) DEVICE — SYRINGE ST FILTERS W/MCE MEMBRAN

## (undated) DEVICE — GOWN,SIRUS,NONRNF,SETINSLV,XL,20/CS: Brand: MEDLINE

## (undated) DEVICE — SYRINGE MED 10ML LUERLOCK TIP W/O SFTY DISP

## (undated) DEVICE — SYRINGE MED 50ML LUERLOCK TIP

## (undated) DEVICE — SOLUTION SCRB 4OZ 10% POVIDONE IOD ANTIMIC BTL

## (undated) DEVICE — 23GA EZPASS SOFT TIP CANNULA BOX OF 5: Brand: VORTEX SURGICAL INC

## (undated) DEVICE — STANDARD HYPODERMIC NEEDLE,ALUMINUM HUB: Brand: MONOJECT

## (undated) DEVICE — OCCUCOAT SYRINGE 1ML 6PK: Brand: OCCUCOAT

## (undated) DEVICE — GOWN,SURGICAL,AURORA,SLEEVE: Brand: MEDLINE

## (undated) DEVICE — SURGICAL PROCEDURE PACK 23 GA VITRECTOMY

## (undated) DEVICE — NEEDLE FLTR 18GA L1.5IN MEM THK5UM BLNT DISP

## (undated) DEVICE — CAUTERY SURG 23GA BPLR 6 PER BX

## (undated) DEVICE — SYRINGE MED 5ML STD CLR PLAS LUERLOCK TIP N CTRL DISP

## (undated) DEVICE — 1 EACH 40411 STERILE DISPOSABLE SUPER VIEW® LENS SET & 1 EACH 40100 STERILE MICROSCOPE DRAPE: Brand: SUPER VIEW® PACK

## (undated) DEVICE — SOLUTION PREP POVIDONE IOD FOR SKIN MUCOUS MEM PRIOR TO

## (undated) DEVICE — PROBE OPHTH LSR STR STRL 23GA

## (undated) DEVICE — SOLUTION IRRIG 1000ML PENTALYTE PLAS POUR BTL TIS U SOL